# Patient Record
Sex: MALE | Race: WHITE | Employment: FULL TIME | URBAN - METROPOLITAN AREA
[De-identification: names, ages, dates, MRNs, and addresses within clinical notes are randomized per-mention and may not be internally consistent; named-entity substitution may affect disease eponyms.]

---

## 2017-06-21 ENCOUNTER — HOSPITAL ENCOUNTER (OUTPATIENT)
Dept: MRI IMAGING | Age: 57
Discharge: HOME | End: 2017-06-21
Payer: COMMERCIAL

## 2017-06-21 DIAGNOSIS — R25.9 PARKINSONIAN FEATURES: ICD-10-CM

## 2017-06-21 PROCEDURE — 70551 MRI BRAIN STEM W/O DYE: CPT

## 2017-09-08 ENCOUNTER — APPOINTMENT (OUTPATIENT)
Dept: GENERAL RADIOLOGY | Age: 57
DRG: 988 | End: 2017-09-08
Attending: FAMILY MEDICINE
Payer: COMMERCIAL

## 2017-09-08 ENCOUNTER — HOSPITAL ENCOUNTER (INPATIENT)
Age: 57
LOS: 2 days | Discharge: HOME HEALTH | DRG: 988 | End: 2017-09-12
Attending: FAMILY MEDICINE | Admitting: FAMILY MEDICINE
Payer: COMMERCIAL

## 2017-09-08 PROBLEM — R25.9 PARKINSONIAN FEATURES: Chronic | Status: ACTIVE | Noted: 2017-09-08

## 2017-09-08 PROBLEM — R29.818 PARKINSONIAN FEATURES: Chronic | Status: ACTIVE | Noted: 2017-09-08

## 2017-09-08 LAB
ABSOLUTE EOS #: 0.1 K/UL (ref 0–0.4)
ABSOLUTE LYMPH #: 0.7 K/UL (ref 1–4.8)
ABSOLUTE MONO #: 0.8 K/UL (ref 0.1–1.3)
ANION GAP SERPL CALCULATED.3IONS-SCNC: 12 MMOL/L (ref 9–17)
BASOPHILS # BLD: 0 %
BASOPHILS ABSOLUTE: 0 K/UL (ref 0–0.2)
BUN BLDV-MCNC: 15 MG/DL (ref 6–20)
BUN/CREAT BLD: ABNORMAL (ref 9–20)
C-REACTIVE PROTEIN: 214.1 MG/L (ref 0–5)
CALCIUM SERPL-MCNC: 8.8 MG/DL (ref 8.6–10.4)
CHLORIDE BLD-SCNC: 98 MMOL/L (ref 98–107)
CO2: 27 MMOL/L (ref 20–31)
CREAT SERPL-MCNC: 0.56 MG/DL (ref 0.7–1.2)
CULTURE: NORMAL
CULTURE: NORMAL
DIFFERENTIAL TYPE: ABNORMAL
DIRECT EXAM: NORMAL
EOSINOPHILS RELATIVE PERCENT: 1 %
GFR AFRICAN AMERICAN: >60 ML/MIN
GFR NON-AFRICAN AMERICAN: >60 ML/MIN
GFR SERPL CREATININE-BSD FRML MDRD: ABNORMAL ML/MIN/{1.73_M2}
GFR SERPL CREATININE-BSD FRML MDRD: ABNORMAL ML/MIN/{1.73_M2}
GLUCOSE BLD-MCNC: 174 MG/DL (ref 75–110)
GLUCOSE BLD-MCNC: 197 MG/DL (ref 75–110)
GLUCOSE BLD-MCNC: 235 MG/DL (ref 70–99)
HCT VFR BLD CALC: 28.4 % (ref 41–53)
HEMOGLOBIN: 10.1 G/DL (ref 13.5–17.5)
LYMPHOCYTES # BLD: 6 %
Lab: NORMAL
MCH RBC QN AUTO: 29.7 PG (ref 26–34)
MCHC RBC AUTO-ENTMCNC: 35.6 G/DL (ref 31–37)
MCV RBC AUTO: 83.4 FL (ref 80–100)
MONOCYTES # BLD: 7 %
PDW BLD-RTO: 12.5 % (ref 11.5–14.9)
PLATELET # BLD: 170 K/UL (ref 150–450)
PLATELET ESTIMATE: ABNORMAL
PMV BLD AUTO: 9.7 FL (ref 6–12)
POTASSIUM SERPL-SCNC: 4.6 MMOL/L (ref 3.7–5.3)
RBC # BLD: 3.41 M/UL (ref 4.5–5.9)
RBC # BLD: ABNORMAL 10*6/UL
SEDIMENTATION RATE, ERYTHROCYTE: 4 MM (ref 0–15)
SEG NEUTROPHILS: 86 %
SEGMENTED NEUTROPHILS ABSOLUTE COUNT: 9 K/UL (ref 1.3–9.1)
SODIUM BLD-SCNC: 137 MMOL/L (ref 135–144)
SPECIMEN DESCRIPTION: NORMAL
SPECIMEN DESCRIPTION: NORMAL
STATUS: NORMAL
WBC # BLD: 10.6 K/UL (ref 3.5–11)
WBC # BLD: ABNORMAL 10*3/UL

## 2017-09-08 PROCEDURE — 86403 PARTICLE AGGLUT ANTBDY SCRN: CPT

## 2017-09-08 PROCEDURE — G0378 HOSPITAL OBSERVATION PER HR: HCPCS

## 2017-09-08 PROCEDURE — 80048 BASIC METABOLIC PNL TOTAL CA: CPT

## 2017-09-08 PROCEDURE — 2580000003 HC RX 258: Performed by: FAMILY MEDICINE

## 2017-09-08 PROCEDURE — 36415 COLL VENOUS BLD VENIPUNCTURE: CPT

## 2017-09-08 PROCEDURE — 87205 SMEAR GRAM STAIN: CPT

## 2017-09-08 PROCEDURE — 85025 COMPLETE CBC W/AUTO DIFF WBC: CPT

## 2017-09-08 PROCEDURE — 96366 THER/PROPH/DIAG IV INF ADDON: CPT

## 2017-09-08 PROCEDURE — 96365 THER/PROPH/DIAG IV INF INIT: CPT

## 2017-09-08 PROCEDURE — 87040 BLOOD CULTURE FOR BACTERIA: CPT

## 2017-09-08 PROCEDURE — 87070 CULTURE OTHR SPECIMN AEROBIC: CPT

## 2017-09-08 PROCEDURE — 6370000000 HC RX 637 (ALT 250 FOR IP): Performed by: FAMILY MEDICINE

## 2017-09-08 PROCEDURE — 87077 CULTURE AEROBIC IDENTIFY: CPT

## 2017-09-08 PROCEDURE — G0379 DIRECT REFER HOSPITAL OBSERV: HCPCS

## 2017-09-08 PROCEDURE — 85651 RBC SED RATE NONAUTOMATED: CPT

## 2017-09-08 PROCEDURE — 73630 X-RAY EXAM OF FOOT: CPT

## 2017-09-08 PROCEDURE — 96367 TX/PROPH/DG ADDL SEQ IV INF: CPT

## 2017-09-08 PROCEDURE — 86140 C-REACTIVE PROTEIN: CPT

## 2017-09-08 PROCEDURE — 99253 IP/OBS CNSLTJ NEW/EST LOW 45: CPT | Performed by: PODIATRIST

## 2017-09-08 PROCEDURE — 87075 CULTR BACTERIA EXCEPT BLOOD: CPT

## 2017-09-08 PROCEDURE — 6360000002 HC RX W HCPCS: Performed by: FAMILY MEDICINE

## 2017-09-08 PROCEDURE — 82947 ASSAY GLUCOSE BLOOD QUANT: CPT

## 2017-09-08 PROCEDURE — 87186 SC STD MICRODIL/AGAR DIL: CPT

## 2017-09-08 RX ORDER — NICOTINE POLACRILEX 4 MG
15 LOZENGE BUCCAL PRN
Status: DISCONTINUED | OUTPATIENT
Start: 2017-09-08 | End: 2017-09-12 | Stop reason: HOSPADM

## 2017-09-08 RX ORDER — M-VIT,TX,IRON,MINS/CALC/FOLIC 27MG-0.4MG
1 TABLET ORAL DAILY
Status: DISCONTINUED | OUTPATIENT
Start: 2017-09-08 | End: 2017-09-12 | Stop reason: HOSPADM

## 2017-09-08 RX ORDER — DEXTROSE MONOHYDRATE 25 G/50ML
12.5 INJECTION, SOLUTION INTRAVENOUS PRN
Status: DISCONTINUED | OUTPATIENT
Start: 2017-09-08 | End: 2017-09-12 | Stop reason: HOSPADM

## 2017-09-08 RX ORDER — DEXTROSE MONOHYDRATE 50 MG/ML
100 INJECTION, SOLUTION INTRAVENOUS PRN
Status: DISCONTINUED | OUTPATIENT
Start: 2017-09-08 | End: 2017-09-12 | Stop reason: HOSPADM

## 2017-09-08 RX ADMIN — INSULIN LISPRO 1 UNITS: 100 INJECTION, SOLUTION INTRAVENOUS; SUBCUTANEOUS at 21:15

## 2017-09-08 RX ADMIN — DEXTROSE 1500 MG: 5 SOLUTION INTRAVENOUS at 21:16

## 2017-09-08 RX ADMIN — CARBIDOPA AND LEVODOPA 1 TABLET: 25; 100 TABLET ORAL at 20:36

## 2017-09-08 RX ADMIN — CEFTRIAXONE SODIUM 1 G: 1 INJECTION, POWDER, FOR SOLUTION INTRAMUSCULAR; INTRAVENOUS at 20:29

## 2017-09-08 ASSESSMENT — PAIN SCALES - GENERAL: PAINLEVEL_OUTOF10: 0

## 2017-09-09 ENCOUNTER — APPOINTMENT (OUTPATIENT)
Dept: MRI IMAGING | Age: 57
DRG: 988 | End: 2017-09-09
Attending: FAMILY MEDICINE
Payer: COMMERCIAL

## 2017-09-09 LAB
GLUCOSE BLD-MCNC: 119 MG/DL (ref 75–110)
GLUCOSE BLD-MCNC: 152 MG/DL (ref 75–110)
GLUCOSE BLD-MCNC: 154 MG/DL (ref 75–110)
GLUCOSE BLD-MCNC: 171 MG/DL (ref 75–110)
GLUCOSE BLD-MCNC: 247 MG/DL (ref 75–110)

## 2017-09-09 PROCEDURE — 6370000000 HC RX 637 (ALT 250 FOR IP): Performed by: FAMILY MEDICINE

## 2017-09-09 PROCEDURE — 2580000003 HC RX 258: Performed by: FAMILY MEDICINE

## 2017-09-09 PROCEDURE — 97530 THERAPEUTIC ACTIVITIES: CPT

## 2017-09-09 PROCEDURE — 97162 PT EVAL MOD COMPLEX 30 MIN: CPT

## 2017-09-09 PROCEDURE — G8979 MOBILITY GOAL STATUS: HCPCS

## 2017-09-09 PROCEDURE — 99222 1ST HOSP IP/OBS MODERATE 55: CPT | Performed by: FAMILY MEDICINE

## 2017-09-09 PROCEDURE — G8978 MOBILITY CURRENT STATUS: HCPCS

## 2017-09-09 PROCEDURE — G0378 HOSPITAL OBSERVATION PER HR: HCPCS

## 2017-09-09 PROCEDURE — 96366 THER/PROPH/DIAG IV INF ADDON: CPT

## 2017-09-09 PROCEDURE — 82947 ASSAY GLUCOSE BLOOD QUANT: CPT

## 2017-09-09 PROCEDURE — 6360000002 HC RX W HCPCS: Performed by: FAMILY MEDICINE

## 2017-09-09 RX ADMIN — VANCOMYCIN HYDROCHLORIDE 1500 MG: 10 INJECTION, POWDER, LYOPHILIZED, FOR SOLUTION INTRAVENOUS at 20:18

## 2017-09-09 RX ADMIN — CARBIDOPA AND LEVODOPA 1 TABLET: 25; 100 TABLET ORAL at 08:10

## 2017-09-09 RX ADMIN — METFORMIN HYDROCHLORIDE 500 MG: 500 TABLET, FILM COATED ORAL at 12:07

## 2017-09-09 RX ADMIN — METFORMIN HYDROCHLORIDE 500 MG: 500 TABLET, FILM COATED ORAL at 17:03

## 2017-09-09 RX ADMIN — VANCOMYCIN HYDROCHLORIDE 1500 MG: 10 INJECTION, POWDER, LYOPHILIZED, FOR SOLUTION INTRAVENOUS at 08:13

## 2017-09-09 RX ADMIN — CEFTRIAXONE SODIUM 1 G: 1 INJECTION, POWDER, FOR SOLUTION INTRAMUSCULAR; INTRAVENOUS at 19:07

## 2017-09-09 RX ADMIN — INSULIN LISPRO 1 UNITS: 100 INJECTION, SOLUTION INTRAVENOUS; SUBCUTANEOUS at 20:22

## 2017-09-09 RX ADMIN — MULTIPLE VITAMINS W/ MINERALS TAB 1 TABLET: TAB at 08:10

## 2017-09-09 RX ADMIN — INSULIN LISPRO 2 UNITS: 100 INJECTION, SOLUTION INTRAVENOUS; SUBCUTANEOUS at 17:03

## 2017-09-09 RX ADMIN — CARBIDOPA AND LEVODOPA 1 TABLET: 25; 100 TABLET ORAL at 20:21

## 2017-09-09 RX ADMIN — INSULIN LISPRO 4 UNITS: 100 INJECTION, SOLUTION INTRAVENOUS; SUBCUTANEOUS at 12:02

## 2017-09-09 RX ADMIN — METFORMIN HYDROCHLORIDE 500 MG: 500 TABLET, FILM COATED ORAL at 08:10

## 2017-09-09 RX ADMIN — CARBIDOPA AND LEVODOPA 1 TABLET: 25; 100 TABLET ORAL at 15:04

## 2017-09-09 ASSESSMENT — PAIN SCALES - GENERAL
PAINLEVEL_OUTOF10: 5
PAINLEVEL_OUTOF10: 0

## 2017-09-10 ENCOUNTER — APPOINTMENT (OUTPATIENT)
Dept: MRI IMAGING | Age: 57
DRG: 988 | End: 2017-09-10
Attending: FAMILY MEDICINE
Payer: COMMERCIAL

## 2017-09-10 LAB
ABSOLUTE BANDS #: 0.2 K/UL (ref 0–1)
ABSOLUTE EOS #: 0.2 K/UL (ref 0–0.4)
ABSOLUTE LYMPH #: 0.2 K/UL (ref 1–4.8)
ABSOLUTE MONO #: 0.61 K/UL (ref 0.1–1.3)
ANION GAP SERPL CALCULATED.3IONS-SCNC: 14 MMOL/L (ref 9–17)
BANDS: 2 %
BASOPHILS # BLD: 1 %
BASOPHILS ABSOLUTE: 0.1 K/UL (ref 0–0.2)
BUN BLDV-MCNC: 10 MG/DL (ref 6–20)
BUN BLDV-MCNC: 11 MG/DL (ref 6–20)
BUN/CREAT BLD: ABNORMAL (ref 9–20)
CALCIUM SERPL-MCNC: 8.8 MG/DL (ref 8.6–10.4)
CHLORIDE BLD-SCNC: 97 MMOL/L (ref 98–107)
CO2: 25 MMOL/L (ref 20–31)
CREAT SERPL-MCNC: 0.6 MG/DL (ref 0.7–1.2)
CREAT SERPL-MCNC: 0.63 MG/DL (ref 0.7–1.2)
DIFFERENTIAL TYPE: ABNORMAL
EOSINOPHILS RELATIVE PERCENT: 2 %
GFR AFRICAN AMERICAN: >60 ML/MIN
GFR AFRICAN AMERICAN: >60 ML/MIN
GFR NON-AFRICAN AMERICAN: >60 ML/MIN
GFR NON-AFRICAN AMERICAN: >60 ML/MIN
GFR SERPL CREATININE-BSD FRML MDRD: ABNORMAL ML/MIN/{1.73_M2}
GLUCOSE BLD-MCNC: 133 MG/DL (ref 75–110)
GLUCOSE BLD-MCNC: 135 MG/DL (ref 75–110)
GLUCOSE BLD-MCNC: 189 MG/DL (ref 75–110)
GLUCOSE BLD-MCNC: 230 MG/DL (ref 70–99)
GLUCOSE BLD-MCNC: 249 MG/DL (ref 75–110)
HCT VFR BLD CALC: 30.4 % (ref 41–53)
HEMOGLOBIN: 10.5 G/DL (ref 13.5–17.5)
LYMPHOCYTES # BLD: 2 %
MCH RBC QN AUTO: 29 PG (ref 26–34)
MCHC RBC AUTO-ENTMCNC: 34.7 G/DL (ref 31–37)
MCV RBC AUTO: 83.7 FL (ref 80–100)
MONOCYTES # BLD: 6 %
MORPHOLOGY: NORMAL
PDW BLD-RTO: 12.3 % (ref 11.5–14.9)
PLATELET # BLD: 192 K/UL (ref 150–450)
PLATELET ESTIMATE: ABNORMAL
PMV BLD AUTO: 8.8 FL (ref 6–12)
POTASSIUM SERPL-SCNC: 4.4 MMOL/L (ref 3.7–5.3)
RBC # BLD: 3.63 M/UL (ref 4.5–5.9)
RBC # BLD: ABNORMAL 10*6/UL
SEG NEUTROPHILS: 87 %
SEGMENTED NEUTROPHILS ABSOLUTE COUNT: 8.79 K/UL (ref 1.3–9.1)
SODIUM BLD-SCNC: 136 MMOL/L (ref 135–144)
VANCOMYCIN TROUGH DATE LAST DOSE: ABNORMAL
VANCOMYCIN TROUGH DOSE AMOUNT: 1500
VANCOMYCIN TROUGH TIME LAST DOSE: 2018
VANCOMYCIN TROUGH: 8.4 UG/ML (ref 10–20)
WBC # BLD: 10.1 K/UL (ref 3.5–11)
WBC # BLD: ABNORMAL 10*3/UL

## 2017-09-10 PROCEDURE — 6360000004 HC RX CONTRAST MEDICATION: Performed by: FAMILY MEDICINE

## 2017-09-10 PROCEDURE — 84520 ASSAY OF UREA NITROGEN: CPT

## 2017-09-10 PROCEDURE — 1200000000 HC SEMI PRIVATE

## 2017-09-10 PROCEDURE — 99232 SBSQ HOSP IP/OBS MODERATE 35: CPT | Performed by: FAMILY MEDICINE

## 2017-09-10 PROCEDURE — A9579 GAD-BASE MR CONTRAST NOS,1ML: HCPCS | Performed by: FAMILY MEDICINE

## 2017-09-10 PROCEDURE — 6360000002 HC RX W HCPCS: Performed by: INTERNAL MEDICINE

## 2017-09-10 PROCEDURE — 6360000002 HC RX W HCPCS: Performed by: FAMILY MEDICINE

## 2017-09-10 PROCEDURE — 2580000003 HC RX 258: Performed by: INTERNAL MEDICINE

## 2017-09-10 PROCEDURE — 82947 ASSAY GLUCOSE BLOOD QUANT: CPT

## 2017-09-10 PROCEDURE — 36415 COLL VENOUS BLD VENIPUNCTURE: CPT

## 2017-09-10 PROCEDURE — 82565 ASSAY OF CREATININE: CPT

## 2017-09-10 PROCEDURE — 6370000000 HC RX 637 (ALT 250 FOR IP): Performed by: FAMILY MEDICINE

## 2017-09-10 PROCEDURE — 80202 ASSAY OF VANCOMYCIN: CPT

## 2017-09-10 PROCEDURE — 2580000003 HC RX 258: Performed by: FAMILY MEDICINE

## 2017-09-10 PROCEDURE — 99254 IP/OBS CNSLTJ NEW/EST MOD 60: CPT | Performed by: INTERNAL MEDICINE

## 2017-09-10 PROCEDURE — 85025 COMPLETE CBC W/AUTO DIFF WBC: CPT

## 2017-09-10 PROCEDURE — 73723 MRI JOINT LWR EXTR W/O&W/DYE: CPT

## 2017-09-10 PROCEDURE — 80048 BASIC METABOLIC PNL TOTAL CA: CPT

## 2017-09-10 RX ORDER — SODIUM CHLORIDE 0.9 % (FLUSH) 0.9 %
10 SYRINGE (ML) INJECTION PRN
Status: DISCONTINUED | OUTPATIENT
Start: 2017-09-10 | End: 2017-09-12 | Stop reason: HOSPADM

## 2017-09-10 RX ADMIN — CARBIDOPA AND LEVODOPA 1 TABLET: 25; 100 TABLET ORAL at 22:34

## 2017-09-10 RX ADMIN — MULTIPLE VITAMINS W/ MINERALS TAB 1 TABLET: TAB at 07:42

## 2017-09-10 RX ADMIN — VANCOMYCIN HYDROCHLORIDE 1750 MG: 10 INJECTION, POWDER, LYOPHILIZED, FOR SOLUTION INTRAVENOUS at 12:37

## 2017-09-10 RX ADMIN — METFORMIN HYDROCHLORIDE 500 MG: 500 TABLET, FILM COATED ORAL at 16:33

## 2017-09-10 RX ADMIN — METFORMIN HYDROCHLORIDE 500 MG: 500 TABLET, FILM COATED ORAL at 12:41

## 2017-09-10 RX ADMIN — CARBIDOPA AND LEVODOPA 1 TABLET: 25; 100 TABLET ORAL at 14:23

## 2017-09-10 RX ADMIN — CARBIDOPA AND LEVODOPA 1 TABLET: 25; 100 TABLET ORAL at 07:42

## 2017-09-10 RX ADMIN — INSULIN LISPRO 4 UNITS: 100 INJECTION, SOLUTION INTRAVENOUS; SUBCUTANEOUS at 16:33

## 2017-09-10 RX ADMIN — Medication 10 ML: at 22:34

## 2017-09-10 RX ADMIN — PIPERACILLIN SODIUM,TAZOBACTAM SODIUM 3.38 G: 3; .375 INJECTION, POWDER, FOR SOLUTION INTRAVENOUS at 22:34

## 2017-09-10 RX ADMIN — INSULIN LISPRO 2 UNITS: 100 INJECTION, SOLUTION INTRAVENOUS; SUBCUTANEOUS at 12:38

## 2017-09-10 RX ADMIN — Medication 10 ML: at 12:20

## 2017-09-10 RX ADMIN — GADOPENTETATE DIMEGLUMINE 20 ML: 469.01 INJECTION INTRAVENOUS at 12:20

## 2017-09-10 RX ADMIN — PIPERACILLIN SODIUM,TAZOBACTAM SODIUM 3.38 G: 3; .375 INJECTION, POWDER, FOR SOLUTION INTRAVENOUS at 16:34

## 2017-09-10 RX ADMIN — METFORMIN HYDROCHLORIDE 500 MG: 500 TABLET, FILM COATED ORAL at 07:42

## 2017-09-10 ASSESSMENT — PAIN SCALES - GENERAL: PAINLEVEL_OUTOF10: 0

## 2017-09-11 ENCOUNTER — ANESTHESIA EVENT (OUTPATIENT)
Dept: OPERATING ROOM | Age: 57
DRG: 988 | End: 2017-09-11
Payer: COMMERCIAL

## 2017-09-11 ENCOUNTER — ANESTHESIA (OUTPATIENT)
Dept: OPERATING ROOM | Age: 57
DRG: 988 | End: 2017-09-11
Payer: COMMERCIAL

## 2017-09-11 VITALS — SYSTOLIC BLOOD PRESSURE: 105 MMHG | DIASTOLIC BLOOD PRESSURE: 63 MMHG | OXYGEN SATURATION: 100 %

## 2017-09-11 LAB
ABSOLUTE EOS #: 0.3 K/UL (ref 0–0.4)
ABSOLUTE LYMPH #: 0.8 K/UL (ref 1–4.8)
ABSOLUTE MONO #: 1.2 K/UL (ref 0.1–1.3)
ANION GAP SERPL CALCULATED.3IONS-SCNC: 12 MMOL/L (ref 9–17)
BASOPHILS # BLD: 0 %
BASOPHILS ABSOLUTE: 0 K/UL (ref 0–0.2)
BUN BLDV-MCNC: 10 MG/DL (ref 6–20)
BUN/CREAT BLD: ABNORMAL (ref 9–20)
CALCIUM SERPL-MCNC: 8.7 MG/DL (ref 8.6–10.4)
CHLORIDE BLD-SCNC: 95 MMOL/L (ref 98–107)
CO2: 29 MMOL/L (ref 20–31)
CREAT SERPL-MCNC: 0.75 MG/DL (ref 0.7–1.2)
DIFFERENTIAL TYPE: ABNORMAL
EOSINOPHILS RELATIVE PERCENT: 2 %
GFR AFRICAN AMERICAN: >60 ML/MIN
GFR NON-AFRICAN AMERICAN: >60 ML/MIN
GFR SERPL CREATININE-BSD FRML MDRD: ABNORMAL ML/MIN/{1.73_M2}
GFR SERPL CREATININE-BSD FRML MDRD: ABNORMAL ML/MIN/{1.73_M2}
GLUCOSE BLD-MCNC: 139 MG/DL (ref 75–110)
GLUCOSE BLD-MCNC: 150 MG/DL (ref 75–110)
GLUCOSE BLD-MCNC: 160 MG/DL (ref 75–110)
GLUCOSE BLD-MCNC: 177 MG/DL (ref 70–99)
GLUCOSE BLD-MCNC: 210 MG/DL (ref 75–110)
HCT VFR BLD CALC: 30.9 % (ref 41–53)
HEMOGLOBIN: 10.6 G/DL (ref 13.5–17.5)
LYMPHOCYTES # BLD: 7 %
MCH RBC QN AUTO: 28.5 PG (ref 26–34)
MCHC RBC AUTO-ENTMCNC: 34.4 G/DL (ref 31–37)
MCV RBC AUTO: 83.1 FL (ref 80–100)
MONOCYTES # BLD: 11 %
PDW BLD-RTO: 12.5 % (ref 11.5–14.9)
PLATELET # BLD: 218 K/UL (ref 150–450)
PLATELET ESTIMATE: ABNORMAL
PMV BLD AUTO: 8.5 FL (ref 6–12)
POTASSIUM SERPL-SCNC: 4.2 MMOL/L (ref 3.7–5.3)
RBC # BLD: 3.72 M/UL (ref 4.5–5.9)
RBC # BLD: ABNORMAL 10*6/UL
SEG NEUTROPHILS: 80 %
SEGMENTED NEUTROPHILS ABSOLUTE COUNT: 9 K/UL (ref 1.3–9.1)
SODIUM BLD-SCNC: 136 MMOL/L (ref 135–144)
WBC # BLD: 11.3 K/UL (ref 3.5–11)
WBC # BLD: ABNORMAL 10*3/UL

## 2017-09-11 PROCEDURE — 87186 SC STD MICRODIL/AGAR DIL: CPT

## 2017-09-11 PROCEDURE — 3600000012 HC SURGERY LEVEL 2 ADDTL 15MIN: Performed by: PODIATRIST

## 2017-09-11 PROCEDURE — 87075 CULTR BACTERIA EXCEPT BLOOD: CPT

## 2017-09-11 PROCEDURE — 80048 BASIC METABOLIC PNL TOTAL CA: CPT

## 2017-09-11 PROCEDURE — 6370000000 HC RX 637 (ALT 250 FOR IP): Performed by: PODIATRIST

## 2017-09-11 PROCEDURE — 86403 PARTICLE AGGLUT ANTBDY SCRN: CPT

## 2017-09-11 PROCEDURE — 6360000002 HC RX W HCPCS: Performed by: INTERNAL MEDICINE

## 2017-09-11 PROCEDURE — 2580000003 HC RX 258: Performed by: NURSE ANESTHETIST, CERTIFIED REGISTERED

## 2017-09-11 PROCEDURE — 3700000001 HC ADD 15 MINUTES (ANESTHESIA): Performed by: PODIATRIST

## 2017-09-11 PROCEDURE — 10061 I&D ABSCESS COMP/MULTIPLE: CPT | Performed by: PODIATRIST

## 2017-09-11 PROCEDURE — 85025 COMPLETE CBC W/AUTO DIFF WBC: CPT

## 2017-09-11 PROCEDURE — 7100000001 HC PACU RECOVERY - ADDTL 15 MIN: Performed by: PODIATRIST

## 2017-09-11 PROCEDURE — 1200000000 HC SEMI PRIVATE

## 2017-09-11 PROCEDURE — 36415 COLL VENOUS BLD VENIPUNCTURE: CPT

## 2017-09-11 PROCEDURE — 2500000003 HC RX 250 WO HCPCS: Performed by: PODIATRIST

## 2017-09-11 PROCEDURE — 97116 GAIT TRAINING THERAPY: CPT

## 2017-09-11 PROCEDURE — 6370000000 HC RX 637 (ALT 250 FOR IP): Performed by: FAMILY MEDICINE

## 2017-09-11 PROCEDURE — 2580000003 HC RX 258: Performed by: INTERNAL MEDICINE

## 2017-09-11 PROCEDURE — 0J9R0ZZ DRAINAGE OF LEFT FOOT SUBCUTANEOUS TISSUE AND FASCIA, OPEN APPROACH: ICD-10-PCS | Performed by: PODIATRIST

## 2017-09-11 PROCEDURE — 97110 THERAPEUTIC EXERCISES: CPT

## 2017-09-11 PROCEDURE — 99232 SBSQ HOSP IP/OBS MODERATE 35: CPT | Performed by: FAMILY MEDICINE

## 2017-09-11 PROCEDURE — 6360000002 HC RX W HCPCS: Performed by: FAMILY MEDICINE

## 2017-09-11 PROCEDURE — 87070 CULTURE OTHR SPECIMN AEROBIC: CPT

## 2017-09-11 PROCEDURE — 6360000002 HC RX W HCPCS: Performed by: NURSE ANESTHETIST, CERTIFIED REGISTERED

## 2017-09-11 PROCEDURE — 87205 SMEAR GRAM STAIN: CPT

## 2017-09-11 PROCEDURE — 7100000000 HC PACU RECOVERY - FIRST 15 MIN: Performed by: PODIATRIST

## 2017-09-11 PROCEDURE — 3700000000 HC ANESTHESIA ATTENDED CARE: Performed by: PODIATRIST

## 2017-09-11 PROCEDURE — 82947 ASSAY GLUCOSE BLOOD QUANT: CPT

## 2017-09-11 PROCEDURE — 2580000003 HC RX 258: Performed by: FAMILY MEDICINE

## 2017-09-11 PROCEDURE — 3600000002 HC SURGERY LEVEL 2 BASE: Performed by: PODIATRIST

## 2017-09-11 RX ORDER — SODIUM CHLORIDE, SODIUM LACTATE, POTASSIUM CHLORIDE, CALCIUM CHLORIDE 600; 310; 30; 20 MG/100ML; MG/100ML; MG/100ML; MG/100ML
INJECTION, SOLUTION INTRAVENOUS CONTINUOUS PRN
Status: DISCONTINUED | OUTPATIENT
Start: 2017-09-11 | End: 2017-09-11 | Stop reason: SDUPTHER

## 2017-09-11 RX ORDER — DIPHENHYDRAMINE HYDROCHLORIDE 50 MG/ML
12.5 INJECTION INTRAMUSCULAR; INTRAVENOUS
Status: DISCONTINUED | OUTPATIENT
Start: 2017-09-11 | End: 2017-09-11 | Stop reason: HOSPADM

## 2017-09-11 RX ORDER — MEPERIDINE HYDROCHLORIDE 25 MG/ML
12.5 INJECTION INTRAMUSCULAR; INTRAVENOUS; SUBCUTANEOUS EVERY 5 MIN PRN
Status: DISCONTINUED | OUTPATIENT
Start: 2017-09-11 | End: 2017-09-11 | Stop reason: HOSPADM

## 2017-09-11 RX ORDER — SODIUM CHLORIDE 9 MG/ML
INJECTION, SOLUTION INTRAVENOUS CONTINUOUS
Status: DISCONTINUED | OUTPATIENT
Start: 2017-09-11 | End: 2017-09-12

## 2017-09-11 RX ORDER — LIDOCAINE HYDROCHLORIDE 10 MG/ML
INJECTION, SOLUTION INFILTRATION; PERINEURAL PRN
Status: DISCONTINUED | OUTPATIENT
Start: 2017-09-11 | End: 2017-09-11 | Stop reason: HOSPADM

## 2017-09-11 RX ORDER — THROMB-CAL-CELL-DRESSING,HEMOS 3" X 3"
1 PADS, MEDICATED (EA) TOPICAL PRN
Status: DISCONTINUED | OUTPATIENT
Start: 2017-09-11 | End: 2017-09-12 | Stop reason: HOSPADM

## 2017-09-11 RX ORDER — MORPHINE SULFATE 4 MG/ML
4 INJECTION, SOLUTION INTRAMUSCULAR; INTRAVENOUS
Status: DISCONTINUED | OUTPATIENT
Start: 2017-09-11 | End: 2017-09-12 | Stop reason: HOSPADM

## 2017-09-11 RX ORDER — OXYCODONE HYDROCHLORIDE AND ACETAMINOPHEN 5; 325 MG/1; MG/1
2 TABLET ORAL PRN
Status: DISCONTINUED | OUTPATIENT
Start: 2017-09-11 | End: 2017-09-11 | Stop reason: HOSPADM

## 2017-09-11 RX ORDER — MIDAZOLAM HYDROCHLORIDE 1 MG/ML
INJECTION INTRAMUSCULAR; INTRAVENOUS PRN
Status: DISCONTINUED | OUTPATIENT
Start: 2017-09-11 | End: 2017-09-11 | Stop reason: SDUPTHER

## 2017-09-11 RX ORDER — FENTANYL CITRATE 50 UG/ML
INJECTION, SOLUTION INTRAMUSCULAR; INTRAVENOUS PRN
Status: DISCONTINUED | OUTPATIENT
Start: 2017-09-11 | End: 2017-09-11 | Stop reason: SDUPTHER

## 2017-09-11 RX ORDER — LABETALOL HYDROCHLORIDE 5 MG/ML
5 INJECTION, SOLUTION INTRAVENOUS EVERY 10 MIN PRN
Status: DISCONTINUED | OUTPATIENT
Start: 2017-09-11 | End: 2017-09-11 | Stop reason: HOSPADM

## 2017-09-11 RX ORDER — PROPOFOL 10 MG/ML
INJECTION, EMULSION INTRAVENOUS PRN
Status: DISCONTINUED | OUTPATIENT
Start: 2017-09-11 | End: 2017-09-11 | Stop reason: SDUPTHER

## 2017-09-11 RX ORDER — BUPIVACAINE HYDROCHLORIDE 5 MG/ML
INJECTION, SOLUTION EPIDURAL; INTRACAUDAL PRN
Status: DISCONTINUED | OUTPATIENT
Start: 2017-09-11 | End: 2017-09-11 | Stop reason: HOSPADM

## 2017-09-11 RX ORDER — OXYCODONE HYDROCHLORIDE AND ACETAMINOPHEN 5; 325 MG/1; MG/1
1 TABLET ORAL PRN
Status: DISCONTINUED | OUTPATIENT
Start: 2017-09-11 | End: 2017-09-11 | Stop reason: HOSPADM

## 2017-09-11 RX ORDER — PROMETHAZINE HYDROCHLORIDE 25 MG/ML
6.25 INJECTION, SOLUTION INTRAMUSCULAR; INTRAVENOUS
Status: DISCONTINUED | OUTPATIENT
Start: 2017-09-11 | End: 2017-09-11 | Stop reason: HOSPADM

## 2017-09-11 RX ORDER — PROPOFOL 10 MG/ML
INJECTION, EMULSION INTRAVENOUS CONTINUOUS PRN
Status: DISCONTINUED | OUTPATIENT
Start: 2017-09-11 | End: 2017-09-11 | Stop reason: SDUPTHER

## 2017-09-11 RX ORDER — MORPHINE SULFATE 2 MG/ML
2 INJECTION, SOLUTION INTRAMUSCULAR; INTRAVENOUS
Status: DISCONTINUED | OUTPATIENT
Start: 2017-09-11 | End: 2017-09-12 | Stop reason: HOSPADM

## 2017-09-11 RX ADMIN — FENTANYL CITRATE 50 MCG: 50 INJECTION, SOLUTION INTRAMUSCULAR; INTRAVENOUS at 12:47

## 2017-09-11 RX ADMIN — CARBIDOPA AND LEVODOPA 1 TABLET: 25; 100 TABLET ORAL at 21:47

## 2017-09-11 RX ADMIN — PROPOFOL 25 MCG/KG/MIN: 10 INJECTION, EMULSION INTRAVENOUS at 12:36

## 2017-09-11 RX ADMIN — VANCOMYCIN HYDROCHLORIDE 1750 MG: 10 INJECTION, POWDER, LYOPHILIZED, FOR SOLUTION INTRAVENOUS at 18:00

## 2017-09-11 RX ADMIN — MULTIPLE VITAMINS W/ MINERALS TAB 1 TABLET: TAB at 16:08

## 2017-09-11 RX ADMIN — PROPOFOL 40 MG: 10 INJECTION, EMULSION INTRAVENOUS at 12:33

## 2017-09-11 RX ADMIN — CARBIDOPA AND LEVODOPA 1 TABLET: 25; 100 TABLET ORAL at 16:07

## 2017-09-11 RX ADMIN — GELATIN ABSORBABLE SPONGE 12-7 MM 1 EACH: 12-7 MISC at 16:04

## 2017-09-11 RX ADMIN — VANCOMYCIN HYDROCHLORIDE 1750 MG: 10 INJECTION, POWDER, LYOPHILIZED, FOR SOLUTION INTRAVENOUS at 00:04

## 2017-09-11 RX ADMIN — MIDAZOLAM 1 MG: 1 INJECTION INTRAMUSCULAR; INTRAVENOUS at 12:47

## 2017-09-11 RX ADMIN — INSULIN LISPRO 2 UNITS: 100 INJECTION, SOLUTION INTRAVENOUS; SUBCUTANEOUS at 16:06

## 2017-09-11 RX ADMIN — PIPERACILLIN SODIUM,TAZOBACTAM SODIUM 3.38 G: 3; .375 INJECTION, POWDER, FOR SOLUTION INTRAVENOUS at 15:28

## 2017-09-11 RX ADMIN — PIPERACILLIN SODIUM,TAZOBACTAM SODIUM 3.38 G: 3; .375 INJECTION, POWDER, FOR SOLUTION INTRAVENOUS at 21:46

## 2017-09-11 RX ADMIN — PIPERACILLIN SODIUM,TAZOBACTAM SODIUM 3.38 G: 3; .375 INJECTION, POWDER, FOR SOLUTION INTRAVENOUS at 03:59

## 2017-09-11 RX ADMIN — METFORMIN HYDROCHLORIDE 500 MG: 500 TABLET, FILM COATED ORAL at 16:01

## 2017-09-11 RX ADMIN — INSULIN LISPRO 2 UNITS: 100 INJECTION, SOLUTION INTRAVENOUS; SUBCUTANEOUS at 21:46

## 2017-09-11 RX ADMIN — THROMBIN, TOPICAL (BOVINE) 20000 UNITS: KIT at 16:04

## 2017-09-11 RX ADMIN — SODIUM CHLORIDE, POTASSIUM CHLORIDE, SODIUM LACTATE AND CALCIUM CHLORIDE: 600; 310; 30; 20 INJECTION, SOLUTION INTRAVENOUS at 12:24

## 2017-09-11 ASSESSMENT — PAIN DESCRIPTION - PAIN TYPE
TYPE: ACUTE PAIN
TYPE: ACUTE PAIN

## 2017-09-11 ASSESSMENT — PAIN DESCRIPTION - LOCATION
LOCATION: FOOT
LOCATION: FOOT

## 2017-09-11 ASSESSMENT — PAIN SCALES - GENERAL
PAINLEVEL_OUTOF10: 4
PAINLEVEL_OUTOF10: 5
PAINLEVEL_OUTOF10: 0

## 2017-09-11 ASSESSMENT — PAIN DESCRIPTION - ORIENTATION: ORIENTATION: LEFT

## 2017-09-12 VITALS
OXYGEN SATURATION: 98 % | WEIGHT: 202.05 LBS | TEMPERATURE: 98.2 F | SYSTOLIC BLOOD PRESSURE: 114 MMHG | HEART RATE: 94 BPM | RESPIRATION RATE: 16 BRPM | HEIGHT: 76 IN | BODY MASS INDEX: 24.6 KG/M2 | DIASTOLIC BLOOD PRESSURE: 61 MMHG

## 2017-09-12 LAB
ABSOLUTE EOS #: 0.3 K/UL (ref 0–0.4)
ABSOLUTE LYMPH #: 0.9 K/UL (ref 1–4.8)
ABSOLUTE MONO #: 0.8 K/UL (ref 0.1–1.3)
ALBUMIN SERPL-MCNC: 3.2 G/DL (ref 3.5–5.2)
ALBUMIN/GLOBULIN RATIO: ABNORMAL (ref 1–2.5)
ALP BLD-CCNC: 73 U/L (ref 40–129)
ALT SERPL-CCNC: 7 U/L (ref 5–41)
ANION GAP SERPL CALCULATED.3IONS-SCNC: 11 MMOL/L (ref 9–17)
AST SERPL-CCNC: 15 U/L
BASOPHILS # BLD: 1 %
BASOPHILS ABSOLUTE: 0 K/UL (ref 0–0.2)
BILIRUB SERPL-MCNC: 0.49 MG/DL (ref 0.3–1.2)
BILIRUBIN DIRECT: 0.16 MG/DL
BILIRUBIN, INDIRECT: 0.33 MG/DL (ref 0–1)
BUN BLDV-MCNC: 10 MG/DL (ref 6–20)
BUN/CREAT BLD: ABNORMAL (ref 9–20)
CALCIUM SERPL-MCNC: 8.9 MG/DL (ref 8.6–10.4)
CHLORIDE BLD-SCNC: 97 MMOL/L (ref 98–107)
CO2: 29 MMOL/L (ref 20–31)
CREAT SERPL-MCNC: 0.66 MG/DL (ref 0.7–1.2)
DIFFERENTIAL TYPE: ABNORMAL
EOSINOPHILS RELATIVE PERCENT: 4 %
GFR AFRICAN AMERICAN: >60 ML/MIN
GFR NON-AFRICAN AMERICAN: >60 ML/MIN
GFR SERPL CREATININE-BSD FRML MDRD: ABNORMAL ML/MIN/{1.73_M2}
GFR SERPL CREATININE-BSD FRML MDRD: ABNORMAL ML/MIN/{1.73_M2}
GLOBULIN: ABNORMAL G/DL (ref 1.5–3.8)
GLUCOSE BLD-MCNC: 105 MG/DL (ref 75–110)
GLUCOSE BLD-MCNC: 119 MG/DL (ref 70–99)
GLUCOSE BLD-MCNC: 128 MG/DL (ref 75–110)
GLUCOSE BLD-MCNC: 255 MG/DL (ref 75–110)
HCT VFR BLD CALC: 26.2 % (ref 41–53)
HEMOGLOBIN: 9.3 G/DL (ref 13.5–17.5)
LYMPHOCYTES # BLD: 12 %
MCH RBC QN AUTO: 29.5 PG (ref 26–34)
MCHC RBC AUTO-ENTMCNC: 35.4 G/DL (ref 31–37)
MCV RBC AUTO: 83.3 FL (ref 80–100)
MONOCYTES # BLD: 11 %
PDW BLD-RTO: 12.3 % (ref 11.5–14.9)
PLATELET # BLD: 219 K/UL (ref 150–450)
PLATELET ESTIMATE: ABNORMAL
PMV BLD AUTO: 8.5 FL (ref 6–12)
POTASSIUM SERPL-SCNC: 5.1 MMOL/L (ref 3.7–5.3)
RBC # BLD: 3.15 M/UL (ref 4.5–5.9)
RBC # BLD: ABNORMAL 10*6/UL
SEG NEUTROPHILS: 72 %
SEGMENTED NEUTROPHILS ABSOLUTE COUNT: 5.7 K/UL (ref 1.3–9.1)
SODIUM BLD-SCNC: 137 MMOL/L (ref 135–144)
TOTAL CK: 78 U/L (ref 39–308)
TOTAL PROTEIN: 6.7 G/DL (ref 6.4–8.3)
WBC # BLD: 7.8 K/UL (ref 3.5–11)
WBC # BLD: ABNORMAL 10*3/UL

## 2017-09-12 PROCEDURE — 6370000000 HC RX 637 (ALT 250 FOR IP): Performed by: FAMILY MEDICINE

## 2017-09-12 PROCEDURE — 99239 HOSP IP/OBS DSCHRG MGMT >30: CPT | Performed by: FAMILY MEDICINE

## 2017-09-12 PROCEDURE — 82947 ASSAY GLUCOSE BLOOD QUANT: CPT

## 2017-09-12 PROCEDURE — 82550 ASSAY OF CK (CPK): CPT

## 2017-09-12 PROCEDURE — 6360000002 HC RX W HCPCS: Performed by: INTERNAL MEDICINE

## 2017-09-12 PROCEDURE — 85025 COMPLETE CBC W/AUTO DIFF WBC: CPT

## 2017-09-12 PROCEDURE — 2580000003 HC RX 258: Performed by: FAMILY MEDICINE

## 2017-09-12 PROCEDURE — 36415 COLL VENOUS BLD VENIPUNCTURE: CPT

## 2017-09-12 PROCEDURE — 2580000003 HC RX 258: Performed by: INTERNAL MEDICINE

## 2017-09-12 PROCEDURE — 97165 OT EVAL LOW COMPLEX 30 MIN: CPT

## 2017-09-12 PROCEDURE — G8988 SELF CARE GOAL STATUS: HCPCS

## 2017-09-12 PROCEDURE — 80048 BASIC METABOLIC PNL TOTAL CA: CPT

## 2017-09-12 PROCEDURE — 80076 HEPATIC FUNCTION PANEL: CPT

## 2017-09-12 PROCEDURE — 99233 SBSQ HOSP IP/OBS HIGH 50: CPT | Performed by: INTERNAL MEDICINE

## 2017-09-12 PROCEDURE — 6360000002 HC RX W HCPCS: Performed by: FAMILY MEDICINE

## 2017-09-12 PROCEDURE — G8987 SELF CARE CURRENT STATUS: HCPCS

## 2017-09-12 RX ORDER — HYDROCODONE BITARTRATE AND ACETAMINOPHEN 5; 325 MG/1; MG/1
1 TABLET ORAL EVERY 6 HOURS PRN
Qty: 30 TABLET | Refills: 0 | Status: SHIPPED | OUTPATIENT
Start: 2017-09-12 | End: 2017-09-19

## 2017-09-12 RX ORDER — CIPROFLOXACIN 500 MG/1
500 TABLET, FILM COATED ORAL EVERY 12 HOURS SCHEDULED
Status: DISCONTINUED | OUTPATIENT
Start: 2017-09-12 | End: 2017-09-12 | Stop reason: HOSPADM

## 2017-09-12 RX ORDER — LINEZOLID 600 MG/1
600 TABLET, FILM COATED ORAL EVERY 12 HOURS SCHEDULED
Status: DISCONTINUED | OUTPATIENT
Start: 2017-09-12 | End: 2017-09-12

## 2017-09-12 RX ADMIN — CARBIDOPA AND LEVODOPA 1 TABLET: 25; 100 TABLET ORAL at 08:13

## 2017-09-12 RX ADMIN — INSULIN LISPRO 6 UNITS: 100 INJECTION, SOLUTION INTRAVENOUS; SUBCUTANEOUS at 11:51

## 2017-09-12 RX ADMIN — PIPERACILLIN SODIUM,TAZOBACTAM SODIUM 3.38 G: 3; .375 INJECTION, POWDER, FOR SOLUTION INTRAVENOUS at 03:55

## 2017-09-12 RX ADMIN — METFORMIN HYDROCHLORIDE 500 MG: 500 TABLET, FILM COATED ORAL at 08:14

## 2017-09-12 RX ADMIN — CARBIDOPA AND LEVODOPA 1 TABLET: 25; 100 TABLET ORAL at 14:28

## 2017-09-12 RX ADMIN — METFORMIN HYDROCHLORIDE 500 MG: 500 TABLET, FILM COATED ORAL at 16:53

## 2017-09-12 RX ADMIN — VANCOMYCIN HYDROCHLORIDE 1750 MG: 10 INJECTION, POWDER, LYOPHILIZED, FOR SOLUTION INTRAVENOUS at 06:32

## 2017-09-12 RX ADMIN — PIPERACILLIN SODIUM,TAZOBACTAM SODIUM 3.38 G: 3; .375 INJECTION, POWDER, FOR SOLUTION INTRAVENOUS at 10:10

## 2017-09-12 RX ADMIN — MULTIPLE VITAMINS W/ MINERALS TAB 1 TABLET: TAB at 08:13

## 2017-09-12 RX ADMIN — METFORMIN HYDROCHLORIDE 500 MG: 500 TABLET, FILM COATED ORAL at 11:51

## 2017-09-12 ASSESSMENT — PAIN DESCRIPTION - PAIN TYPE
TYPE: ACUTE PAIN;SURGICAL PAIN
TYPE: ACUTE PAIN

## 2017-09-12 ASSESSMENT — PAIN DESCRIPTION - LOCATION
LOCATION: FOOT

## 2017-09-12 ASSESSMENT — PAIN DESCRIPTION - ORIENTATION
ORIENTATION: LEFT

## 2017-09-12 ASSESSMENT — PAIN SCALES - GENERAL
PAINLEVEL_OUTOF10: 4
PAINLEVEL_OUTOF10: 3

## 2017-09-13 LAB
CULTURE: ABNORMAL
DIRECT EXAM: ABNORMAL
Lab: ABNORMAL
ORGANISM: ABNORMAL
SPECIMEN DESCRIPTION: ABNORMAL
STATUS: ABNORMAL

## 2017-09-14 LAB
CULTURE: NORMAL
Lab: NORMAL
SPECIMEN DESCRIPTION: NORMAL
STATUS: NORMAL
STATUS: NORMAL

## 2017-09-15 ENCOUNTER — HOSPITAL ENCOUNTER (OUTPATIENT)
Dept: WOUND CARE | Age: 57
Discharge: HOME | End: 2017-09-15
Payer: COMMERCIAL

## 2017-09-15 VITALS
RESPIRATION RATE: 18 BRPM | WEIGHT: 202 LBS | BODY MASS INDEX: 24.6 KG/M2 | HEART RATE: 94 BPM | DIASTOLIC BLOOD PRESSURE: 81 MMHG | SYSTOLIC BLOOD PRESSURE: 151 MMHG | HEIGHT: 76 IN | TEMPERATURE: 97 F

## 2017-09-15 DIAGNOSIS — L03.119 CELLULITIS AND ABSCESS OF FOOT: Primary | ICD-10-CM

## 2017-09-15 DIAGNOSIS — E11.628 TYPE 2 DIABETES MELLITUS WITH LEFT DIABETIC FOOT INFECTION (HCC): ICD-10-CM

## 2017-09-15 DIAGNOSIS — L97.509 TYPE 2 DIABETES MELLITUS WITH FOOT ULCER, WITHOUT LONG-TERM CURRENT USE OF INSULIN (HCC): ICD-10-CM

## 2017-09-15 DIAGNOSIS — L08.9 TYPE 2 DIABETES MELLITUS WITH LEFT DIABETIC FOOT INFECTION (HCC): ICD-10-CM

## 2017-09-15 DIAGNOSIS — L97.523 ULCER OF FOOT, LEFT, WITH NECROSIS OF MUSCLE (HCC): ICD-10-CM

## 2017-09-15 DIAGNOSIS — E11.621 TYPE 2 DIABETES MELLITUS WITH FOOT ULCER, WITHOUT LONG-TERM CURRENT USE OF INSULIN (HCC): ICD-10-CM

## 2017-09-15 DIAGNOSIS — E11.42 TYPE 2 DIABETES MELLITUS WITH DIABETIC POLYNEUROPATHY, WITHOUT LONG-TERM CURRENT USE OF INSULIN (HCC): ICD-10-CM

## 2017-09-15 DIAGNOSIS — L02.619 CELLULITIS AND ABSCESS OF FOOT: Primary | ICD-10-CM

## 2017-09-15 PROCEDURE — 11042 DBRDMT SUBQ TIS 1ST 20SQCM/<: CPT

## 2017-09-15 PROCEDURE — 11042 DBRDMT SUBQ TIS 1ST 20SQCM/<: CPT | Performed by: PODIATRIST

## 2017-09-15 PROCEDURE — 99214 OFFICE O/P EST MOD 30 MIN: CPT

## 2017-09-15 PROCEDURE — 6370000000 HC RX 637 (ALT 250 FOR IP): Performed by: PODIATRIST

## 2017-09-15 RX ADMIN — LIDOCAINE HYDROCHLORIDE: 20 JELLY TOPICAL at 08:47

## 2017-09-15 ASSESSMENT — PAIN SCALES - GENERAL: PAINLEVEL_OUTOF10: 0

## 2017-09-16 LAB
CULTURE: ABNORMAL
DIRECT EXAM: ABNORMAL
DIRECT EXAM: ABNORMAL
Lab: ABNORMAL
ORGANISM: ABNORMAL
SPECIMEN DESCRIPTION: ABNORMAL
SPECIMEN DESCRIPTION: ABNORMAL
STATUS: ABNORMAL

## 2017-09-21 ENCOUNTER — HOSPITAL ENCOUNTER (OUTPATIENT)
Dept: WOUND CARE | Age: 57
Discharge: HOME | DRG: 988 | End: 2017-09-21
Payer: COMMERCIAL

## 2017-09-21 VITALS
DIASTOLIC BLOOD PRESSURE: 92 MMHG | SYSTOLIC BLOOD PRESSURE: 139 MMHG | RESPIRATION RATE: 18 BRPM | TEMPERATURE: 97 F | HEART RATE: 94 BPM

## 2017-09-21 PROCEDURE — 11042 DBRDMT SUBQ TIS 1ST 20SQCM/<: CPT

## 2017-09-21 PROCEDURE — 6370000000 HC RX 637 (ALT 250 FOR IP): Performed by: NURSE PRACTITIONER

## 2017-09-21 PROCEDURE — 11042 DBRDMT SUBQ TIS 1ST 20SQCM/<: CPT | Performed by: NURSE PRACTITIONER

## 2017-09-21 RX ADMIN — LIDOCAINE HYDROCHLORIDE: 20 JELLY TOPICAL at 09:38

## 2017-09-29 ENCOUNTER — HOSPITAL ENCOUNTER (OUTPATIENT)
Dept: WOUND CARE | Age: 57
Discharge: HOME | End: 2017-09-29
Payer: COMMERCIAL

## 2017-09-29 VITALS
RESPIRATION RATE: 18 BRPM | SYSTOLIC BLOOD PRESSURE: 135 MMHG | HEART RATE: 88 BPM | WEIGHT: 202 LBS | BODY MASS INDEX: 24.6 KG/M2 | HEIGHT: 76 IN | DIASTOLIC BLOOD PRESSURE: 81 MMHG | TEMPERATURE: 97.7 F

## 2017-09-29 DIAGNOSIS — E11.42 TYPE 2 DIABETES MELLITUS WITH DIABETIC POLYNEUROPATHY, WITHOUT LONG-TERM CURRENT USE OF INSULIN (HCC): ICD-10-CM

## 2017-09-29 DIAGNOSIS — E11.621 TYPE 2 DIABETES MELLITUS WITH FOOT ULCER, WITHOUT LONG-TERM CURRENT USE OF INSULIN (HCC): ICD-10-CM

## 2017-09-29 DIAGNOSIS — L97.523 ULCER OF FOOT, LEFT, WITH NECROSIS OF MUSCLE (HCC): ICD-10-CM

## 2017-09-29 DIAGNOSIS — L08.9 TYPE 2 DIABETES MELLITUS WITH LEFT DIABETIC FOOT INFECTION (HCC): Primary | ICD-10-CM

## 2017-09-29 DIAGNOSIS — L97.509 TYPE 2 DIABETES MELLITUS WITH FOOT ULCER, WITHOUT LONG-TERM CURRENT USE OF INSULIN (HCC): ICD-10-CM

## 2017-09-29 DIAGNOSIS — E11.628 TYPE 2 DIABETES MELLITUS WITH LEFT DIABETIC FOOT INFECTION (HCC): Primary | ICD-10-CM

## 2017-09-29 PROCEDURE — 11042 DBRDMT SUBQ TIS 1ST 20SQCM/<: CPT

## 2017-09-29 PROCEDURE — 11043 DBRDMT MUSC&/FSCA 1ST 20/<: CPT | Performed by: PODIATRIST

## 2017-09-29 PROCEDURE — 6370000000 HC RX 637 (ALT 250 FOR IP): Performed by: PODIATRIST

## 2017-09-29 RX ADMIN — LIDOCAINE HYDROCHLORIDE: 20 JELLY TOPICAL at 10:26

## 2017-10-06 ENCOUNTER — HOSPITAL ENCOUNTER (OUTPATIENT)
Dept: WOUND CARE | Age: 57
Discharge: HOME | End: 2017-10-06
Payer: COMMERCIAL

## 2017-10-06 VITALS
DIASTOLIC BLOOD PRESSURE: 82 MMHG | BODY MASS INDEX: 24.6 KG/M2 | WEIGHT: 202 LBS | HEIGHT: 76 IN | TEMPERATURE: 96.7 F | RESPIRATION RATE: 18 BRPM | SYSTOLIC BLOOD PRESSURE: 146 MMHG | HEART RATE: 86 BPM

## 2017-10-06 DIAGNOSIS — E11.628 TYPE 2 DIABETES MELLITUS WITH LEFT DIABETIC FOOT INFECTION (HCC): ICD-10-CM

## 2017-10-06 DIAGNOSIS — L97.509 TYPE 2 DIABETES MELLITUS WITH FOOT ULCER, WITHOUT LONG-TERM CURRENT USE OF INSULIN (HCC): ICD-10-CM

## 2017-10-06 DIAGNOSIS — E11.42 TYPE 2 DIABETES MELLITUS WITH DIABETIC POLYNEUROPATHY, WITHOUT LONG-TERM CURRENT USE OF INSULIN (HCC): ICD-10-CM

## 2017-10-06 DIAGNOSIS — L97.523 ULCER OF FOOT, LEFT, WITH NECROSIS OF MUSCLE (HCC): Primary | ICD-10-CM

## 2017-10-06 DIAGNOSIS — L08.9 TYPE 2 DIABETES MELLITUS WITH LEFT DIABETIC FOOT INFECTION (HCC): ICD-10-CM

## 2017-10-06 DIAGNOSIS — E11.621 TYPE 2 DIABETES MELLITUS WITH FOOT ULCER, WITHOUT LONG-TERM CURRENT USE OF INSULIN (HCC): ICD-10-CM

## 2017-10-06 PROCEDURE — 86403 PARTICLE AGGLUT ANTBDY SCRN: CPT

## 2017-10-06 PROCEDURE — 6370000000 HC RX 637 (ALT 250 FOR IP): Performed by: PODIATRIST

## 2017-10-06 PROCEDURE — 87186 SC STD MICRODIL/AGAR DIL: CPT

## 2017-10-06 PROCEDURE — 11043 DBRDMT MUSC&/FSCA 1ST 20/<: CPT

## 2017-10-06 PROCEDURE — 87076 CULTURE ANAEROBE IDENT EACH: CPT

## 2017-10-06 PROCEDURE — 11043 DBRDMT MUSC&/FSCA 1ST 20/<: CPT | Performed by: PODIATRIST

## 2017-10-06 PROCEDURE — 87205 SMEAR GRAM STAIN: CPT

## 2017-10-06 PROCEDURE — 87070 CULTURE OTHR SPECIMN AEROBIC: CPT

## 2017-10-06 PROCEDURE — 87075 CULTR BACTERIA EXCEPT BLOOD: CPT

## 2017-10-06 PROCEDURE — 87185 SC STD ENZYME DETCJ PER NZM: CPT

## 2017-10-06 RX ORDER — CLINDAMYCIN HYDROCHLORIDE 300 MG/1
300 CAPSULE ORAL 3 TIMES DAILY
Qty: 90 CAPSULE | Refills: 0 | Status: SHIPPED | OUTPATIENT
Start: 2017-10-06 | End: 2017-11-10 | Stop reason: ALTCHOICE

## 2017-10-06 RX ORDER — CIPROFLOXACIN 500 MG/1
500 TABLET, FILM COATED ORAL 2 TIMES DAILY
Qty: 60 TABLET | Refills: 0 | Status: SHIPPED | OUTPATIENT
Start: 2017-10-06 | End: 2017-11-05

## 2017-10-06 RX ADMIN — LIDOCAINE HYDROCHLORIDE: 20 JELLY TOPICAL at 10:23

## 2017-10-06 ASSESSMENT — PAIN SCALES - GENERAL: PAINLEVEL_OUTOF10: 0

## 2017-10-06 NOTE — PLAN OF CARE
Problem: Falls - Risk of:  Goal: Will remain free from falls  Will remain free from falls   Outcome: Ongoing  Safety maintained no falls this visit

## 2017-10-06 NOTE — PROGRESS NOTES
[facility]  Progress Note      Shell Hall  AGE: 55 y.o. GENDER: male  : 1960  TODAY'S DATE:  10/6/2017    Chief Complaint   Patient presents with    Wound Check     wound         Subjective:      Shell Hall is a 55 y.o. male diabetic who presents to clinic for follow up Left foot wound. States he has been trying to stay off his foot but has been weight bearing. BG controlled. Patient relates minimal pain. BG under control. He is trying to eat more protein. Has been getting pus this week. Patient recently hospitalized from 17 through 17 for cellulitis and deep space abscess Left foot. MRI on 9/10/17 was negative for osteomyelitis. S/p I&D Left foot (DOS 17). Patient seen by ID Dr. Jonathan Paul while in house and discharged on ceftriaxone. Patient states he has completed IV antibiotics. Abx: Yes   Cultures: +MRSA on 17  Pain: 0/10    PAST MEDICAL HISTORY        Diagnosis Date    Diabetes mellitus ([facility] [address]     Shoulder bursitis        MEDICATIONS    Current Outpatient Prescriptions on File Prior to Encounter   Medication Sig Dispense Refill    carbidopa-levodopa (SINEMET)  MG per tablet TAKE 1 TABLET BY MOUTH THREE TIMES A DAY  1    metFORMIN (GLUCOPHAGE) 500 MG tablet 500 mg 3 times daily With meals      Probiotic Product (PROBIOTIC PO) Take by mouth      Lancets MISC Use daily 100 each 3    glucose blood VI test strips (ASCENSIA AUTODISC VI;ONE TOUCH ULTRA TEST VI) strip Use with associated glucose meter. Use q day 50 strip 3    Multiple Vitamins-Minerals (THERAPEUTIC MULTIVITAMIN-MINERALS) tablet Take 1 tablet by mouth daily       No current facility-administered medications on file prior to encounter.         ALLERGIES    No Known Allergies    PAST SURGICAL HISTORY    Past Surgical History:   Procedure Laterality Date    [place] DEEP DISSEC FOOT INFEC,1 BURSA Left 2017     INCISION AND DRAINAGE FOOT performed by Caroline Moe DPM at [address] [place] [address] OR       FAMILY HISTORY    family history includes Cancer in his sister; Diabetes in an other family member; Heart Disease in his mother; High Blood Pressure in his mother. SOCIAL HISTORY    Social History   Substance Use Topics    Smoking status: Former Smoker    Smokeless tobacco: Never Used    Alcohol use Yes      Comment: occasionally       REVIEW OF SYSTEMS    Constitutional: negative for anorexia, chills, fatigue, fevers, malaise, sweats and weight loss  Musculoskeletal:negative except for slight pain left foot  Neurological: positive for tremors and numbness  Behavioral/Psych: negative      Objective:      BP (!) 146/82  Pulse 86  Temp 96.7 °F (35.9 °C) (Tympanic)   Resp 18  Ht 6' 4\" (1.93 m)  Wt 202 lb (91.6 kg)  BMI 24.59 kg/m2  General Appearance: alert and oriented to person, place and time, well-developed and well-nourished, in no acute distress  Wound 09/09/17 Other (Comment) Foot Left;Plantar (Active)   Wound Type Wound 9/11/2017  9:45 AM   Dressing Status Clean;Dry; Intact 9/11/2017  9:45 AM   Dressing/Treatment Ace Wrap 9/11/2017  9:45 AM   Dressing Change Due 09/11/17 9/11/2017  9:45 AM   Wound Assessment LUAN 9/9/2017  8:18 PM   Bonita-wound Assessment Pink;Swelling 9/9/2017  8:18 PM   Number of days:26       Wound 09/15/17 #1 lt foot between great toe and second digit,surgery 9/11/17 (Active)   Wound Type Incision 10/6/2017 10:31 AM   Wound Other 9/15/2017  8:38 AM   Dressing Status Old drainage 10/6/2017 10:31 AM   Dressing Changed Changed/New 10/6/2017 10:31 AM   Wound Cleansed Rinsed/Irrigated with saline 10/6/2017 10:31 AM   Wound Length (cm) 4.5 cm 10/6/2017 10:31 AM   Wound Width (cm) 0.5 cm 10/6/2017 10:31 AM   Wound Depth (cm)  .5 10/6/2017 10:31 AM   Calculated Wound Size (cm^2) (l*w) 2.25 cm^2 10/6/2017 10:31 AM   Change in Wound Size % (l*w) -25 10/6/2017 10:31 AM   Wound Assessment Brown;Granulation tissue;Pink;Red 10/6/2017 10:31 AM   Margins Defined edges 9/15/2017  8:38 AM Bonita-wound Assessment Brown;Excoriated 10/6/2017 10:31 AM   [place]%Wound Bed 0 9/21/2017  9:17 AM   Red%Wound Bed 80 10/6/2017 10:31 AM   Yellow%Wound Bed 20 10/6/2017 10:31 AM   Drainage Amount Moderate 10/6/2017 10:31 AM   Drainage Description Serosanguinous; Yellow 10/6/2017 10:31 AM   Odor Mild 10/6/2017 10:31 AM   Debridement per physician Subcutaneous 9/21/2017  9:45 AM   Time out Yes 9/21/2017  9:45 AM   Procedural Pain 0 9/21/2017  9:45 AM   Post procedural Pain 0 9/21/2017  9:45 AM   Number of days:21       Wound 09/15/17 #2 left foot plantar (Active)   Wound Type Incision 9/29/2017 10:26 AM   Wound Other 9/15/2017  8:38 AM   Dressing Status Old drainage 10/6/2017 10:31 AM   Dressing Changed Changed/New 10/6/2017 10:31 AM   Wound Cleansed Rinsed/Irrigated with saline 10/6/2017 10:31 AM   Wound Length (cm) 2 cm 10/6/2017 10:31 AM   Wound Width (cm) 1.5 cm 10/6/2017 10:31 AM   Wound Depth (cm)  .1 10/6/2017 10:31 AM   Calculated Wound Size (cm^2) (l*w) 3 cm^2 10/6/2017 10:31 AM   Change in Wound Size % (l*w) 20 10/6/2017 10:31 AM   Distance Tunneling (cm) 2.5 cm 9/21/2017  9:17 AM   Tunneling Position ___ O'Clock 1 9/21/2017  9:17 AM   Wound Assessment Clean;Dry; Intact;Granulation tissue;Pink;Red 10/6/2017 10:31 AM   Margins Defined edges 9/15/2017  8:38 AM   Exposed structure Bone 9/29/2017 10:58 AM   Bonita-wound Assessment Maceration 10/6/2017 10:31 AM   [place]%Wound Bed 10 9/15/2017  8:38 AM   Red%Wound Bed 100 10/6/2017 10:31 AM   Yellow%Wound Bed 40 9/29/2017 10:26 AM   Drainage Amount Moderate 10/6/2017 10:31 AM   Drainage Description Serosanguinous 10/6/2017 10:31 AM   Odor None 10/6/2017 10:31 AM   Debridement per physician Subcutaneous 9/21/2017  9:45 AM   Time out Yes 9/21/2017  9:45 AM   Procedural Pain 0 9/21/2017  9:45 AM   Post procedural Pain 0 9/21/2017  9:45 AM   Number of days:21       Wound 10/06/17 wound#3 left foot plantar distal (Active)   Wound Type Wound 10/6/2017 10:31 AM   Dressing POSITIVE Final     Synercid  NOT REPORTED Final     cefoxitin screen  NOT REPORTED Final     ciprofloxacin  NOT REPORTED Final     clindamycin Resistant <=0.25 RESISTANT Final     erythromycin Resistant >=8 RESISTANT Final     gentamicin Sensitive <=0.5 SUSCEPTIBLE Final     levofloxacin Resistant >=8 RESISTANT Final     linezolid  NOT REPORTED Final     moxifloxacin  NOT REPORTED Final     nitrofurantoin  NOT REPORTED Final     oxacillin Resistant >=4 RESISTANT Final     penicillin Resistant >=0.5 RESISTANT Final     rifampin  NOT REPORTED Final     tetracycline Sensitive <=1 SUSCEPTIBLE Final     tigecycline  NOT REPORTED Final     trimethoprim-sulfamethoxazole Sensitive <=10 SUSCEPTIBLE Final     vancomycin Sensitive <=0.5 SUSCEPTIBLE Final                  Assessment:       1. Ulcer of foot, left, with necrosis of muscle    2. Type 2 diabetes mellitus with diabetic polyneuropathy, without long-term current use of insulin ([address]    3. Type 2 diabetes mellitus with foot ulcer, without long-term current use of insulin ([address]    4. Type 2 diabetes mellitus with left diabetic foot infection [facility])        Patient Active Problem List   Diagnosis    Hyperglycemia    Essential hypertension    Type 2 diabetes mellitus with left diabetic foot infection ([address]    Ulcer of foot ([address]    Type 2 diabetes mellitus with diabetic polyneuropathy, without long-term current use of insulin (HCC)    Cellulitis and abscess of foot    Parkinsonian features    Type 2 diabetes mellitus with foot ulcer, without long-term current use of insulin ([address]       Plan:   Pt was evaluated and examined. Patient was given personalized discharge instructions. Diagnosis was discussed with the pt and all of their questions were answered in detail. Proper foot hygiene and care was discussed with the pt. Patient to check feet daily and contact the office with any questions/problems/concerns.   Other comorbidity noted and will be managed by PCP.    Procedure:  Wound Location: left    Lidocaine gel soaked gauze was applied at beginning of wound evaluation. The wound(s) was excisionally debrided sharply of fibrotic, necrotic, and hyperkeratotic tissue, including a layer of surrounding healthy tissue using curette. The wound was debrided down through and including subcutaneous. , muscle    Percent of Wound Debrided: 75%    Total Surface Area Debrided:  (see above for sq cm)    Bleeding: Minimal    Patient tolerated procedure well and was given proper instruction. Post debridement wound description:  clean  Post debridement Wound Location:left foot    Plan for wound  - Treatment: Paint edges and incision line with betadine, pack lightly with silvercel rope, cover with an ABD, wrap with kerlix, and ACE wrap from toes to just above ankle. - Wear surgical shoe but strict non-weightbearing Left lower extremity  - Continue with antibioitcs  - Increase protein to diet (meat, cheese, eggs, fish, peanut butter, nuts and beans)  - Multivitamin daily  - Follow up: 1 week. - Detailed home instructions and education material given to patient prior to discharge. Patient is at high risk of developing new abscess versus osteomyelitis and therefore daily wound assessment by a health care provider is necessary. At this time patient has wound probing 4cm to bone from both plantar and webspace locations. The extent of wound care he requires on a daily basis which involves deep space wound packing exceeds what can be reasonably expected from the average family member. The female family member present with him today states that she cannot perform his wound care at this time nor can she vouch for anyone else in the family being adequate for the role. It is therefore my medical opinion that the insurance company provide daily dressing changes by [facility] until wound progresses to more superficial state to avoid proximal amputation.         Marylen Horton, DPM on

## 2017-10-13 ENCOUNTER — HOSPITAL ENCOUNTER (OUTPATIENT)
Dept: WOUND CARE | Age: 57
Discharge: HOME | End: 2017-10-13
Payer: COMMERCIAL

## 2017-10-13 VITALS
DIASTOLIC BLOOD PRESSURE: 79 MMHG | HEIGHT: 76 IN | TEMPERATURE: 97.3 F | SYSTOLIC BLOOD PRESSURE: 126 MMHG | RESPIRATION RATE: 18 BRPM | WEIGHT: 202 LBS | HEART RATE: 98 BPM | BODY MASS INDEX: 24.6 KG/M2

## 2017-10-13 DIAGNOSIS — E11.621 TYPE 2 DIABETES MELLITUS WITH FOOT ULCER, WITHOUT LONG-TERM CURRENT USE OF INSULIN (HCC): ICD-10-CM

## 2017-10-13 DIAGNOSIS — L97.523 ULCER OF FOOT, LEFT, WITH NECROSIS OF MUSCLE (HCC): Primary | ICD-10-CM

## 2017-10-13 DIAGNOSIS — E11.42 TYPE 2 DIABETES MELLITUS WITH DIABETIC POLYNEUROPATHY, WITHOUT LONG-TERM CURRENT USE OF INSULIN (HCC): ICD-10-CM

## 2017-10-13 DIAGNOSIS — L97.509 TYPE 2 DIABETES MELLITUS WITH FOOT ULCER, WITHOUT LONG-TERM CURRENT USE OF INSULIN (HCC): ICD-10-CM

## 2017-10-13 DIAGNOSIS — L08.9 TYPE 2 DIABETES MELLITUS WITH LEFT DIABETIC FOOT INFECTION (HCC): ICD-10-CM

## 2017-10-13 DIAGNOSIS — E11.628 TYPE 2 DIABETES MELLITUS WITH LEFT DIABETIC FOOT INFECTION (HCC): ICD-10-CM

## 2017-10-13 PROCEDURE — 11043 DBRDMT MUSC&/FSCA 1ST 20/<: CPT | Performed by: PODIATRIST

## 2017-10-13 PROCEDURE — 11043 DBRDMT MUSC&/FSCA 1ST 20/<: CPT

## 2017-10-13 PROCEDURE — 6370000000 HC RX 637 (ALT 250 FOR IP): Performed by: PODIATRIST

## 2017-10-13 RX ADMIN — LIDOCAINE HYDROCHLORIDE: 20 JELLY TOPICAL at 11:26

## 2017-10-13 ASSESSMENT — PAIN SCALES - GENERAL: PAINLEVEL_OUTOF10: 0

## 2017-10-13 NOTE — PLAN OF CARE
Problem: Pain:  Goal: Control of acute pain  Control of acute pain   Outcome: Completed Date Met: 10/13/17  Not acute

## 2017-10-13 NOTE — PLAN OF CARE
Problem: Wound:  Goal: Will show signs of wound healing; wound closure and no evidence of infection  Will show signs of wound healing; wound closure and no evidence of infection   Outcome: Ongoing  Wound continues to have deep areas will continue current dressing

## 2017-10-13 NOTE — PROGRESS NOTES
[facility]  Progress Note      Henry Mccracken  AGE: 55 y.o. GENDER: male  : 1960  TODAY'S DATE:  10/13/2017    No chief complaint on file. Subjective:      Henry Mccracken is a 55 y.o. male diabetic who presents to clinic for follow up Left foot wound. His culture was reviewed. He is on oral antibiotics to cover. States he has been trying to stay off his foot but has been weight bearing. BG controlled. Patient relates minimal pain. BG under control. He is trying to eat more protein. Patient recently hospitalized from 17 through 17 for cellulitis and deep space abscess Left foot. MRI on 9/10/17 was negative for osteomyelitis. S/p I&D Left foot (DOS 17). Patient seen by ID Dr. Lisa Walsh while in house and discharged on ceftriaxone. Patient states he has completed IV antibiotics. Abx: Yes   Cultures: +MRSA on 17  Pain: 0/10    PAST MEDICAL HISTORY        Diagnosis Date    Diabetes mellitus ([facility] [address]     Shoulder bursitis        MEDICATIONS    Current Outpatient Prescriptions on File Prior to Encounter   Medication Sig Dispense Refill    clindamycin (CLEOCIN) 300 MG capsule Take 1 capsule by mouth 3 times daily 90 capsule 0    ciprofloxacin (CIPRO) 500 MG tablet Take 1 tablet by mouth 2 times daily 60 tablet 0    carbidopa-levodopa (SINEMET)  MG per tablet TAKE 1 TABLET BY MOUTH THREE TIMES A DAY  1    metFORMIN (GLUCOPHAGE) 500 MG tablet 500 mg 3 times daily With meals      Probiotic Product (PROBIOTIC PO) Take by mouth      Lancets MISC Use daily 100 each 3    glucose blood VI test strips (ASCENSIA AUTODISC VI;ONE TOUCH ULTRA TEST VI) strip Use with associated glucose meter. Use q day 50 strip 3    Multiple Vitamins-Minerals (THERAPEUTIC MULTIVITAMIN-MINERALS) tablet Take 1 tablet by mouth daily       No current facility-administered medications on file prior to encounter.         ALLERGIES    No Known Allergies    PAST SURGICAL HISTORY    Past Surgical History:   Procedure Laterality Date    [place] DEEP DISSEC FOOT INFEC,1 BURSA Left 9/11/2017     INCISION AND DRAINAGE FOOT performed by Marylen Horton, DPM at [address] [facility]    family history includes Cancer in his sister; Diabetes in an other family member; Heart Disease in his mother; High Blood Pressure in his mother. SOCIAL HISTORY    Social History   Substance Use Topics    Smoking status: Former Smoker    Smokeless tobacco: Never Used    Alcohol use Yes      Comment: occasionally       REVIEW OF SYSTEMS    Constitutional: negative for anorexia, chills, fatigue, fevers, malaise, sweats and weight loss  Musculoskeletal:negative except for slight pain left foot  Neurological: positive for tremors and numbness  Behavioral/Psych: negative      Objective: There were no vitals taken for this visit. General Appearance: alert and oriented to person, place and time, well-developed and well-nourished, in no acute distress      Wound 09/15/17 #1 lt foot between great toe and second digit,surgery 9/11/17 (Active)   Wound Type Incision 10/13/2017 11:15 AM   Wound Other 9/15/2017  8:38 AM   Dressing Status Old drainage 10/13/2017 11:15 AM   Dressing Changed Changed/New 10/13/2017 11:15 AM   Wound Cleansed Rinsed/Irrigated with saline 10/13/2017 11:15 AM   Wound Length (cm) 2.7 cm 10/13/2017 11:15 AM   Wound Width (cm) 0.3 cm 10/13/2017 11:15 AM   Wound Depth (cm)  0.1 10/13/2017 11:15 AM   Calculated Wound Size (cm^2) (l*w) 0.81 cm^2 10/13/2017 11:15 AM   Change in Wound Size % (l*w) 55 10/13/2017 11:15 AM   Wound Assessment Clean;Dry; Intact; Hyper granulation tissue 10/13/2017 11:15 AM   Margins Defined edges 9/15/2017  8:38 AM   Bonita-wound Assessment Calloused;Clean 10/13/2017 11:15 AM   [place]%Wound Bed 0 9/21/2017  9:17 AM   Red%Wound Bed 100 10/13/2017 11:15 AM   Yellow%Wound Bed 0 10/13/2017 11:15 AM   Drainage Amount Moderate 10/13/2017 11:15 AM   Drainage Description Serosanguinous 10/13/2017 11:15 AM   Calculated Wound Size (cm^2) (l*w) 0.16 cm^2 10/13/2017 11:15 AM   Change in Wound Size % (l*w) 96.8 10/13/2017 11:15 AM   Wound Assessment Clean;Dry; Intact; Red 10/13/2017 11:15 AM   Bonita-wound Assessment Calloused 10/13/2017 11:15 AM   Red%Wound Bed 100 10/13/2017 11:15 AM   Yellow%Wound Bed 0 10/13/2017 11:15 AM   Drainage Amount Moderate 10/13/2017 11:15 AM   Drainage Description Serosanguinous 10/13/2017 11:15 AM   Odor None 10/13/2017 11:15 AM   Number of days: 7     Improved, no pus  Integument:  Warm, dry, supple, Bilateral.    Open lesions present, Left. Blanchable rubor that is somewhat dependent on elevation present to Left foot  Moderation sanguinous drainage noted. No purulence. Mild mal odor. Distal and proximal wounds probe to bone. Vascular:  DP/PT pulses palpable 2/4, Bilateral.    CFT <3 seconds to digits 1-5, Bilateral .   Hair growth absent to level of digits, Bilateral.  Edema present, Left. Erythema absent, Left.      Neurological:  Sensation absent to light touch to level of digits, Bilateral.  Protective sensation  absent via 5.07/10g Alden-Jonah monofilament 10/10 sites, Bilateral.  Vibratory sensation absent to 1st MPJ, Bilateral.     Musculoskeletal:  Muscle strength 5/5 all LE groups tested, Bilateral.     9/11/17 wound cultures  Culture & Susceptibility      METHICILLIN RESISTANT STAPHYLOCOCCUS AUREUS      Antibiotic Interpretation ANNA Status     Induced Clind Resist Resistant POSITIVE Final     Synercid  NOT REPORTED Final     cefoxitin screen  NOT REPORTED Final     ciprofloxacin  NOT REPORTED Final     clindamycin Resistant <=0.25 RESISTANT Final     erythromycin Resistant >=8 RESISTANT Final     gentamicin Sensitive <=0.5 SUSCEPTIBLE Final     levofloxacin Resistant >=8 RESISTANT Final     linezolid  NOT REPORTED Final     moxifloxacin  NOT REPORTED Final     nitrofurantoin  NOT REPORTED Final     oxacillin Resistant >=4 RESISTANT Final     penicillin Resistant >=0.5 RESISTANT Final     rifampin  NOT REPORTED Final     tetracycline Sensitive <=1 SUSCEPTIBLE Final     tigecycline  NOT REPORTED Final     trimethoprim-sulfamethoxazole Sensitive <=10 SUSCEPTIBLE Final     vancomycin Sensitive <=0.5 SUSCEPTIBLE Final                  Assessment:       1. Ulcer of foot, left, with necrosis of muscle    2. Type 2 diabetes mellitus with foot ulcer, without long-term current use of insulin ([address]    3. Type 2 diabetes mellitus with diabetic polyneuropathy, without long-term current use of insulin ([address]    4. Type 2 diabetes mellitus with left diabetic foot infection [facility])        Patient Active Problem List   Diagnosis    Hyperglycemia    Essential hypertension    Type 2 diabetes mellitus with left diabetic foot infection ([facility] [address] [facility]    Ulcer of foot ([facility] [address]    Type 2 diabetes mellitus with diabetic polyneuropathy, without long-term current use of insulin ([facility])    Cellulitis and abscess of foot    Parkinsonian features    Type 2 diabetes mellitus with foot ulcer, without long-term current use of insulin ([address]       Plan:   Pt was evaluated and examined. Patient was given personalized discharge instructions. Diagnosis was discussed with the pt and all of their questions were answered in detail. Proper foot hygiene and care was discussed with the pt. Patient to check feet daily and contact the office with any questions/problems/concerns. Other comorbidity noted and will be managed by PCP. Procedure:  Wound Location: left    Lidocaine gel soaked gauze was applied at beginning of wound evaluation. The wound(s) was excisionally debrided sharply of fibrotic, necrotic, and hyperkeratotic tissue, including a layer of surrounding healthy tissue using curette. The wound was debrided down through and including subcutaneous. , muscle    Percent of Wound Debrided: 75%    Total Surface Area Debrided:  (see above for sq cm)    Bleeding: Minimal    Patient tolerated procedure well and was given proper instruction. Post debridement wound description:  clean  Post debridement Wound Location:left foot    Plan for wound  - Treatment: Paint edges and incision line with betadine, pack lightly with silvercel rope, cover with an ABD, wrap with kerlix, and ACE wrap from toes to just above ankle. - Wear surgical shoe but strict non-weightbearing Left lower extremity  - Continue with antibioitcs  - Increase protein to diet (meat, cheese, eggs, fish, peanut butter, nuts and beans)  - Multivitamin daily  - Follow up: 1 week. - Detailed home instructions and education material given to patient prior to discharge. Patient is at high risk of developing new abscess versus osteomyelitis and therefore daily wound assessment by a health care provider is necessary. At this time patient has wound probing 4cm to bone from both plantar and webspace locations. The extent of wound care he requires on a daily basis which involves deep space wound packing exceeds what can be reasonably expected from the average family member. The female family member present with him today states that she cannot perform his wound care at this time nor can she vouch for anyone else in the family being adequate for the role. It is therefore my medical opinion that the insurance company provide daily dressing changes by [facility] until wound progresses to more superficial state to avoid proximal amputation.         Radha Ramos DPM on 10/13/2017 at 17:50 AM  Board Certified, American Board of Podiatric Surgery  Fellow, [employer] and [employer]

## 2017-10-20 ENCOUNTER — HOSPITAL ENCOUNTER (OUTPATIENT)
Dept: WOUND CARE | Age: 57
Discharge: HOME | End: 2017-10-20
Payer: COMMERCIAL

## 2017-10-20 VITALS
RESPIRATION RATE: 95 BRPM | SYSTOLIC BLOOD PRESSURE: 149 MMHG | HEART RATE: 95 BPM | DIASTOLIC BLOOD PRESSURE: 90 MMHG | TEMPERATURE: 95.2 F

## 2017-10-20 DIAGNOSIS — L97.509 TYPE 2 DIABETES MELLITUS WITH FOOT ULCER, WITHOUT LONG-TERM CURRENT USE OF INSULIN (HCC): ICD-10-CM

## 2017-10-20 DIAGNOSIS — L97.523 SKIN ULCER OF LEFT FOOT WITH NECROSIS OF MUSCLE (HCC): Primary | ICD-10-CM

## 2017-10-20 DIAGNOSIS — E11.42 TYPE 2 DIABETES MELLITUS WITH DIABETIC POLYNEUROPATHY, WITHOUT LONG-TERM CURRENT USE OF INSULIN (HCC): ICD-10-CM

## 2017-10-20 DIAGNOSIS — L08.9 TYPE 2 DIABETES MELLITUS WITH LEFT DIABETIC FOOT INFECTION (HCC): ICD-10-CM

## 2017-10-20 DIAGNOSIS — E11.621 TYPE 2 DIABETES MELLITUS WITH FOOT ULCER, WITHOUT LONG-TERM CURRENT USE OF INSULIN (HCC): ICD-10-CM

## 2017-10-20 DIAGNOSIS — E11.628 TYPE 2 DIABETES MELLITUS WITH LEFT DIABETIC FOOT INFECTION (HCC): ICD-10-CM

## 2017-10-20 PROCEDURE — 11042 DBRDMT SUBQ TIS 1ST 20SQCM/<: CPT

## 2017-10-20 PROCEDURE — 11042 DBRDMT SUBQ TIS 1ST 20SQCM/<: CPT | Performed by: PODIATRIST

## 2017-10-20 PROCEDURE — 6370000000 HC RX 637 (ALT 250 FOR IP): Performed by: PODIATRIST

## 2017-10-20 RX ORDER — CIPROFLOXACIN 500 MG/1
500 TABLET, FILM COATED ORAL 2 TIMES DAILY
Qty: 28 TABLET | Refills: 0 | Status: SHIPPED | OUTPATIENT
Start: 2017-10-20 | End: 2017-11-28 | Stop reason: ALTCHOICE

## 2017-10-20 RX ADMIN — LIDOCAINE HYDROCHLORIDE: 20 JELLY TOPICAL at 10:01

## 2017-10-20 NOTE — PROGRESS NOTES
ALLERGIES    No Known Allergies    PAST SURGICAL HISTORY    Past Surgical History:   Procedure Laterality Date    [place] DEEP DISSEC FOOT INFEC,1 BURSA Left 9/11/2017     INCISION AND DRAINAGE FOOT performed by Sherman Butts DPM at [address] [facility]    family history includes Cancer in his sister; Diabetes in an other family member; Heart Disease in his mother; High Blood Pressure in his mother. SOCIAL HISTORY    Social History   Substance Use Topics    Smoking status: Former Smoker    Smokeless tobacco: Never Used    Alcohol use Yes      Comment: occasionally       REVIEW OF SYSTEMS    Constitutional: negative for anorexia, chills, fatigue, fevers, malaise, sweats and weight loss  Musculoskeletal:negative except for slight pain left foot  Neurological: positive for tremors and numbness  Behavioral/Psych: negative      Objective:      BP (!) 149/90   Pulse 95   Temp 95.2 °F (35.1 °C) (Tympanic)   Resp (!) 95   General Appearance: alert and oriented to person, place and time, well-developed and well-nourished, in no acute distress      Wound 09/15/17 #1 lt foot between great toe and second digit,surgery 9/11/17 (Active)   Wound Type Incision 10/20/2017  9:41 AM   Wound Other 9/15/2017  8:38 AM   Dressing Status Old drainage 10/20/2017  9:41 AM   Dressing Changed Changed/New 10/20/2017  9:41 AM   Wound Cleansed Rinsed/Irrigated with saline 10/20/2017  9:41 AM   Wound Length (cm) 2.2 cm 10/20/2017  9:41 AM   Wound Width (cm) 1.3 cm 10/20/2017  9:41 AM   Wound Depth (cm)  0.1 10/20/2017  9:41 AM   Calculated Wound Size (cm^2) (l*w) 2.86 cm^2 10/20/2017  9:41 AM   Change in Wound Size % (l*w) -58.89 10/20/2017  9:41 AM   Wound Assessment Hyper granulation tissue 10/20/2017  9:41 AM   Margins Defined edges 9/15/2017  8:38 AM   Bonita-wound Assessment Calloused; Maceration 10/20/2017  9:41 AM   [place]%Wound Bed 0 9/21/2017  9:17 AM   Red%Wound Bed 70 10/20/2017  9:41 AM   Yellow%Wound Bed 30 10/20/2017  9:41 AM   Drainage Amount Moderate 10/20/2017  9:41 AM   Drainage Description Serosanguinous 10/20/2017  9:41 AM   Odor None 10/20/2017  9:41 AM   Debridement per physician Muscle 10/13/2017 11:37 AM   Time out Yes 10/13/2017 11:37 AM   Procedural Pain 0 10/13/2017 11:37 AM   Post procedural Pain 0 10/13/2017 11:37 AM   Number of days: 35       Wound 10/06/17 wound#3 left foot plantar distal (Active)   Wound Type Wound 10/13/2017 11:15 AM   Dressing Status Old drainage 10/13/2017 11:15 AM   Dressing Changed Changed/New 10/13/2017 11:15 AM   Wound Cleansed Rinsed/Irrigated with saline 10/13/2017 11:15 AM   Wound Length (cm) 3.8 cm 10/20/2017  9:41 AM   Wound Width (cm) 1.8 cm 10/20/2017  9:41 AM   Wound Depth (cm)  0.1 10/20/2017  9:41 AM   Calculated Wound Size (cm^2) (l*w) 6.84 cm^2 10/20/2017  9:41 AM   Change in Wound Size % (l*w) -36.8 10/20/2017  9:41 AM   Wound Assessment Clean;Dry; Intact; Red 10/13/2017 11:15 AM   Bonita-wound Assessment Calloused 10/13/2017 11:15 AM   Red%Wound Bed 100 10/13/2017 11:15 AM   Yellow%Wound Bed 0 10/13/2017 11:15 AM   Drainage Amount Moderate 10/13/2017 11:15 AM   Drainage Description Serosanguinous 10/13/2017 11:15 AM   Odor None 10/13/2017 11:15 AM   Debridement per physician Subcutaneous 10/20/2017  9:41 AM   Time out Yes 10/20/2017  9:41 AM   Procedural Pain 0 10/20/2017  9:41 AM   Post procedural Pain 0 10/20/2017  9:41 AM   Number of days: 14         Improved, no pus, not as deep    Integument:  Warm, dry, supple, Bilateral.    Open lesions present, Left. Blanchable rubor that is somewhat dependent on elevation present to Left foot  Moderation sanguinous drainage noted. No purulence. No mal odor. Distal and proximal wounds do not probe to bone. Vascular:  DP/PT pulses palpable 2/4, Bilateral.    CFT <3 seconds to digits 1-5, Bilateral .   Hair growth absent to level of digits, Bilateral.  Edema present, Left. Erythema absent, Left. packing exceeds what can be reasonably expected from the average family member. The female family member present with him today states that she cannot perform his wound care at this time nor can she vouch for anyone else in the family being adequate for the role. It is therefore my medical opinion that the insurance company provide daily dressing changes by Leo Morel until wound progresses to more superficial state to avoid proximal amputation.         Luiz Burroughs DPM on 10/20/2017 at 42:50 AM  Board Certified, American Board of Podiatric Surgery  Fellow, [employer] and [employer] [facility]

## 2017-10-27 ENCOUNTER — HOSPITAL ENCOUNTER (OUTPATIENT)
Dept: WOUND CARE | Age: 57
Discharge: HOME | End: 2017-10-27
Payer: COMMERCIAL

## 2017-10-27 VITALS
TEMPERATURE: 97.3 F | SYSTOLIC BLOOD PRESSURE: 134 MMHG | RESPIRATION RATE: 16 BRPM | DIASTOLIC BLOOD PRESSURE: 80 MMHG | HEART RATE: 89 BPM

## 2017-10-27 DIAGNOSIS — E11.621 TYPE 2 DIABETES MELLITUS WITH FOOT ULCER, WITHOUT LONG-TERM CURRENT USE OF INSULIN (HCC): ICD-10-CM

## 2017-10-27 DIAGNOSIS — L08.9 TYPE 2 DIABETES MELLITUS WITH LEFT DIABETIC FOOT INFECTION (HCC): ICD-10-CM

## 2017-10-27 DIAGNOSIS — L97.509 TYPE 2 DIABETES MELLITUS WITH FOOT ULCER, WITHOUT LONG-TERM CURRENT USE OF INSULIN (HCC): ICD-10-CM

## 2017-10-27 DIAGNOSIS — L97.523 SKIN ULCER OF LEFT FOOT WITH NECROSIS OF MUSCLE (HCC): Primary | ICD-10-CM

## 2017-10-27 DIAGNOSIS — E11.42 TYPE 2 DIABETES MELLITUS WITH DIABETIC POLYNEUROPATHY, WITHOUT LONG-TERM CURRENT USE OF INSULIN (HCC): ICD-10-CM

## 2017-10-27 DIAGNOSIS — E11.628 TYPE 2 DIABETES MELLITUS WITH LEFT DIABETIC FOOT INFECTION (HCC): ICD-10-CM

## 2017-10-27 PROCEDURE — 6370000000 HC RX 637 (ALT 250 FOR IP): Performed by: PODIATRIST

## 2017-10-27 PROCEDURE — 11042 DBRDMT SUBQ TIS 1ST 20SQCM/<: CPT | Performed by: PODIATRIST

## 2017-10-27 PROCEDURE — 11042 DBRDMT SUBQ TIS 1ST 20SQCM/<: CPT

## 2017-10-27 RX ADMIN — LIDOCAINE HYDROCHLORIDE: 20 JELLY TOPICAL at 10:58

## 2017-10-27 NOTE — PROGRESS NOTES
Yellow%Wound Bed 0 10/27/2017 10:54 AM   Drainage Amount Moderate 10/27/2017 10:54 AM   Drainage Description Serosanguinous 10/27/2017 10:54 AM   Odor None 10/27/2017 10:54 AM   Debridement per physician Subcutaneous 10/27/2017 11:10 AM   Time out Yes 10/27/2017 11:10 AM   Procedural Pain 0 10/27/2017 11:10 AM   Post procedural Pain 0 10/27/2017 11:10 AM   Number of days: 42       Wound 10/06/17 wound#3 left foot plantar distal (Active)   Wound Type Wound 10/27/2017 10:54 AM   Dressing Status Old drainage 10/27/2017 10:54 AM   Dressing Changed Changed/New 10/27/2017 10:54 AM   Wound Cleansed Rinsed/Irrigated with saline 10/27/2017 10:54 AM   Wound Length (cm) 2.9 cm 10/27/2017 11:10 AM   Wound Width (cm) 1.1 cm 10/27/2017 11:10 AM   Wound Depth (cm)  0.7 10/27/2017 11:10 AM   Calculated Wound Size (cm^2) (l*w) 3.19 cm^2 10/27/2017 11:10 AM   Change in Wound Size % (l*w) 36.2 10/27/2017 11:10 AM   Wound Assessment Clean;Dry; Intact; Red 10/13/2017 11:15 AM   Bonita-wound Assessment Calloused; Maceration 10/27/2017 10:54 AM   Red%Wound Bed 90 10/27/2017 10:54 AM   Yellow%Wound Bed 10 10/27/2017 10:54 AM   Drainage Amount Moderate 10/27/2017 10:54 AM   Drainage Description Serosanguinous 10/27/2017 10:54 AM   Odor None 10/27/2017 10:54 AM   Debridement per physician Subcutaneous 10/27/2017 11:10 AM   Time out Yes 10/27/2017 11:10 AM   Procedural Pain 0 10/27/2017 11:10 AM   Post procedural Pain 0 10/27/2017 11:10 AM   Number of days: 21       Improved, no pus, not as deep    Integument:  Warm, dry, supple, Bilateral.    Open lesions present, Left. Blanchable rubor that is somewhat dependent on elevation present to Left foot  Moderation sanguinous drainage noted. No purulence. No mal odor. Distal and proximal wounds do not probe to bone. Vascular:  DP/PT pulses palpable 2/4, Bilateral.    CFT <3 seconds to digits 1-5, Bilateral .   Hair growth absent to level of digits, Bilateral.  Edema present, Left.   Erythema Parkinsonian features    Type 2 diabetes mellitus with foot ulcer, without long-term current use of insulin (HCC)    Skin ulcer of left foot with necrosis of muscle ([address]       Plan:   Pt was evaluated and examined. Patient was given personalized discharge instructions. Diagnosis was discussed with the pt and all of their questions were answered in detail. Proper foot hygiene and care was discussed with the pt. Patient to check feet daily and contact the office with any questions/problems/concerns. Other comorbidity noted and will be managed by PCP. Procedure:  Wound Location: left    Lidocaine gel soaked gauze was applied at beginning of wound evaluation. The wound(s) was excisionally debrided sharply of fibrotic, necrotic, and hyperkeratotic tissue, including a layer of surrounding healthy tissue using curette. The wound was debrided down through and including subcutaneous. ,     Percent of Wound Debrided: 75%    Total Surface Area Debrided:  (see above for sq cm)    Bleeding: Minimal    Patient tolerated procedure well and was given proper instruction. Post debridement wound description:  clean  Post debridement Wound Location:left foot    Plan for wound  - Treatment: Paint edges and incision line with betadine, pack lightly with silvercel rope, cover with an ABD, wrap with kerlix, and ACE wrap from toes to just above ankle. - Wear surgical shoe but strict non-weightbearing Left lower extremity  - Continue with antibioitcs  - Increase protein to diet (meat, cheese, eggs, fish, peanut butter, nuts and beans)  - Multivitamin daily  - Follow up: 1 week. - Detailed home instructions and education material given to patient prior to discharge.            Emerson Howard DPM on 10/27/2017 at 91:31 AM  Board Certified, American Board of Podiatric Surgery  Fellow, [employer] and [employer] [place]

## 2017-10-27 NOTE — PLAN OF CARE
Problem: Wound:  Goal: Will show signs of wound healing; wound closure and no evidence of infection  Will show signs of wound healing; wound closure and no evidence of infection   Outcome: Ongoing  Wound improving will continue current dressing

## 2017-10-27 NOTE — PLAN OF CARE
Problem: Wound:  Goal: Will show signs of wound healing; wound closure and no evidence of infection  Will show signs of wound healing; wound closure and no evidence of infection   Outcome: Ongoing  Wound continues to improve will continue silvercel

## 2017-11-03 ENCOUNTER — HOSPITAL ENCOUNTER (OUTPATIENT)
Dept: WOUND CARE | Age: 57
Discharge: HOME | End: 2017-11-03
Payer: COMMERCIAL

## 2017-11-03 VITALS
WEIGHT: 202 LBS | DIASTOLIC BLOOD PRESSURE: 82 MMHG | BODY MASS INDEX: 24.6 KG/M2 | RESPIRATION RATE: 18 BRPM | HEART RATE: 89 BPM | TEMPERATURE: 97.3 F | HEIGHT: 76 IN | SYSTOLIC BLOOD PRESSURE: 138 MMHG

## 2017-11-03 DIAGNOSIS — E11.628 TYPE 2 DIABETES MELLITUS WITH LEFT DIABETIC FOOT INFECTION (HCC): ICD-10-CM

## 2017-11-03 DIAGNOSIS — E11.42 TYPE 2 DIABETES MELLITUS WITH DIABETIC POLYNEUROPATHY, WITHOUT LONG-TERM CURRENT USE OF INSULIN (HCC): ICD-10-CM

## 2017-11-03 DIAGNOSIS — L08.9 TYPE 2 DIABETES MELLITUS WITH LEFT DIABETIC FOOT INFECTION (HCC): ICD-10-CM

## 2017-11-03 DIAGNOSIS — L97.523 SKIN ULCER OF LEFT FOOT WITH NECROSIS OF MUSCLE (HCC): Primary | ICD-10-CM

## 2017-11-03 DIAGNOSIS — L97.509 TYPE 2 DIABETES MELLITUS WITH FOOT ULCER, WITHOUT LONG-TERM CURRENT USE OF INSULIN (HCC): ICD-10-CM

## 2017-11-03 DIAGNOSIS — E11.621 TYPE 2 DIABETES MELLITUS WITH FOOT ULCER, WITHOUT LONG-TERM CURRENT USE OF INSULIN (HCC): ICD-10-CM

## 2017-11-03 PROCEDURE — 11042 DBRDMT SUBQ TIS 1ST 20SQCM/<: CPT

## 2017-11-03 PROCEDURE — 6370000000 HC RX 637 (ALT 250 FOR IP): Performed by: PODIATRIST

## 2017-11-03 PROCEDURE — 11042 DBRDMT SUBQ TIS 1ST 20SQCM/<: CPT | Performed by: PODIATRIST

## 2017-11-03 RX ADMIN — LIDOCAINE HYDROCHLORIDE: 20 JELLY TOPICAL at 09:38

## 2017-11-03 ASSESSMENT — PAIN DESCRIPTION - PAIN TYPE: TYPE: ACUTE PAIN

## 2017-11-03 NOTE — PLAN OF CARE
Problem: Wound:  Goal: Will show signs of wound healing; wound closure and no evidence of infection  Will show signs of wound healing; wound closure and no evidence of infection   Outcome: Ongoing  Wound remains S/S free of infection, silver stephen rope continued    Problem: Falls - Risk of:  Goal: Will remain free from falls  Will remain free from falls   Outcome: Met This Shift  Safety maintained no falls this visit

## 2017-11-03 NOTE — PROGRESS NOTES
ALLERGIES    No Known Allergies    PAST SURGICAL HISTORY    Past Surgical History:   Procedure Laterality Date    [place] DEEP DISSEC FOOT INFEC,1 BURSA Left 9/11/2017     INCISION AND DRAINAGE FOOT performed by Ludy Cartwright DPM at [address] [facility]    family history includes Cancer in his sister; Diabetes in an other family member; Heart Disease in his mother; High Blood Pressure in his mother. SOCIAL HISTORY    Social History   Substance Use Topics    Smoking status: Former Smoker    Smokeless tobacco: Never Used    Alcohol use Yes      Comment: occasionally       REVIEW OF SYSTEMS    Constitutional: negative for anorexia, chills, fatigue, fevers, malaise, sweats and weight loss  Musculoskeletal:negative except for slight pain left foot  Neurological: positive for tremors and numbness  Behavioral/Psych: negative      Objective: There were no vitals taken for this visit.   General Appearance: alert and oriented to person, place and time, well-developed and well-nourished, in no acute distress    Wound 09/15/17 #1 lt foot between great toe and second digit,surgery 9/11/17 (Active)   Wound Type Incision 11/3/2017  9:33 AM   Wound Other 9/15/2017  8:38 AM   Dressing Status Old drainage 11/3/2017  9:33 AM   Dressing Changed Changed/New 11/3/2017  9:33 AM   Wound Cleansed Rinsed/Irrigated with saline 11/3/2017  9:33 AM   Wound Length (cm) 2 cm 11/3/2017  9:33 AM   Wound Width (cm) 0.8 cm 11/3/2017  9:33 AM   Wound Depth (cm)  .7 11/3/2017  9:33 AM   Calculated Wound Size (cm^2) (l*w) 1.6 cm^2 11/3/2017  9:33 AM   Change in Wound Size % (l*w) 11.11 11/3/2017  9:33 AM   Wound Assessment Granulation tissue 11/3/2017  9:33 AM   Margins Defined edges 9/15/2017  8:38 AM   Bonita-wound Assessment Fragile 11/3/2017  9:33 AM   [place]%Wound Bed 0 9/21/2017  9:17 AM   Red%Wound Bed 100 11/3/2017  9:33 AM   Yellow%Wound Bed 0 10/27/2017 10:54 AM   Drainage Amount Moderate 11/3/2017  9:33 AM   Drainage Description Serosanguinous; Yellow 11/3/2017  9:33 AM   Odor None 11/3/2017  9:33 AM   Debridement per physician Subcutaneous 10/27/2017 11:10 AM   Time out Yes 10/27/2017 11:10 AM   Procedural Pain 0 10/27/2017 11:10 AM   Post procedural Pain 0 10/27/2017 11:10 AM   Number of days: 49       Wound 10/06/17 wound#3 left foot plantar distal (Active)   Wound Type Wound 11/3/2017  9:33 AM   Dressing Status Old drainage 11/3/2017  9:33 AM   Dressing Changed Changed/New 11/3/2017  9:33 AM   Wound Cleansed Rinsed/Irrigated with saline 11/3/2017  9:33 AM   Wound Length (cm) 1 cm 11/3/2017  9:33 AM   Wound Width (cm) 0.5 cm 11/3/2017  9:33 AM   Wound Depth (cm)  .8 11/3/2017  9:33 AM   Calculated Wound Size (cm^2) (l*w) 0.5 cm^2 11/3/2017  9:33 AM   Change in Wound Size % (l*w) 90 11/3/2017  9:33 AM   Wound Assessment Granulation tissue 11/3/2017  9:33 AM   Bonita-wound Assessment Calloused; Maceration 11/3/2017  9:33 AM   Red%Wound Bed 90 11/3/2017  9:33 AM   Yellow%Wound Bed 10 11/3/2017  9:33 AM   Drainage Amount Moderate 11/3/2017  9:33 AM   Drainage Description Serosanguinous; Yellow 11/3/2017  9:33 AM   Odor None 11/3/2017  9:33 AM   Debridement per physician Subcutaneous 10/27/2017 11:10 AM   Time out Yes 10/27/2017 11:10 AM   Procedural Pain 0 10/27/2017 11:10 AM   Post procedural Pain 0 10/27/2017 11:10 AM   Number of days: 27       Improved, no pus, not as deep    Integument:  Warm, dry, supple, Bilateral.    Open lesions present, Left. Blanchable rubor that is somewhat dependent on elevation present to Left foot  Moderation sanguinous drainage noted. No purulence. No mal odor. Distal and proximal wounds do not probe to bone. Vascular:  DP/PT pulses palpable 2/4, Bilateral.    CFT <3 seconds to digits 1-5, Bilateral .   Hair growth absent to level of digits, Bilateral.  Edema present, Left. Erythema absent, Left.      Neurological:  Sensation absent to light touch to level of digits, Bilateral.  Protective sensation  absent via 5.07/10g [place]-Jonah monofilament 10/10 sites, Bilateral.  Vibratory sensation absent to 1st MPJ, Bilateral.     Musculoskeletal:  Muscle strength 5/5 all LE groups tested, Bilateral.     9/11/17 wound cultures  Culture & Susceptibility      METHICILLIN RESISTANT STAPHYLOCOCCUS AUREUS      Antibiotic Interpretation ANNA Status     Induced Clind Resist Resistant POSITIVE Final     Synercid  NOT REPORTED Final     cefoxitin screen  NOT REPORTED Final     ciprofloxacin  NOT REPORTED Final     clindamycin Resistant <=0.25 RESISTANT Final     erythromycin Resistant >=8 RESISTANT Final     gentamicin Sensitive <=0.5 SUSCEPTIBLE Final     levofloxacin Resistant >=8 RESISTANT Final     linezolid  NOT REPORTED Final     moxifloxacin  NOT REPORTED Final     nitrofurantoin  NOT REPORTED Final     oxacillin Resistant >=4 RESISTANT Final     penicillin Resistant >=0.5 RESISTANT Final     rifampin  NOT REPORTED Final     tetracycline Sensitive <=1 SUSCEPTIBLE Final     tigecycline  NOT REPORTED Final     trimethoprim-sulfamethoxazole Sensitive <=10 SUSCEPTIBLE Final     vancomycin Sensitive <=0.5 SUSCEPTIBLE Final                  Assessment:       1. Skin ulcer of left foot with necrosis of muscle ([facility]    2. Type 2 diabetes mellitus with foot ulcer, without long-term current use of insulin ([address]    3. Type 2 diabetes mellitus with diabetic polyneuropathy, without long-term current use of insulin ([address]    4.  Type 2 diabetes mellitus with left diabetic foot infection [facility])        Patient Active Problem List   Diagnosis    Hyperglycemia    Essential hypertension    Type 2 diabetes mellitus with left diabetic foot infection ([address]    Ulcer of foot ([facility]    Type 2 diabetes mellitus with diabetic polyneuropathy, without long-term current use of insulin (HCC)    Cellulitis and abscess of foot    Parkinsonian features    Type 2 diabetes mellitus with foot ulcer, without long-term current use of insulin ([facility]    Skin ulcer of left foot with necrosis of muscle ([facility]       Plan:   Pt was evaluated and examined. Patient was given personalized discharge instructions. Diagnosis was discussed with the pt and all of their questions were answered in detail. Proper foot hygiene and care was discussed with the pt. Patient to check feet daily and contact the office with any questions/problems/concerns. Other comorbidity noted and will be managed by PCP. Procedure:  Wound Location: left    Lidocaine gel soaked gauze was applied at beginning of wound evaluation. The wound(s) was excisionally debrided sharply of fibrotic, necrotic, and hyperkeratotic tissue, including a layer of surrounding healthy tissue using curette. The wound was debrided down through and including subcutaneous. ,     Percent of Wound Debrided: 75%    Total Surface Area Debrided:  (see above for sq cm)    Bleeding: Minimal    Patient tolerated procedure well and was given proper instruction. Post debridement wound description:  clean  Post debridement Wound Location:left foot    Plan for wound  - Treatment: Paint edges and incision line with betadine, pack lightly with silvercel rope, cover with an ABD, wrap with kerlix, and ACE wrap from toes to just above ankle. - Wear surgical shoe but strict non-weightbearing Left lower extremity  - Continue with antibioitcs  - Increase protein to diet (meat, cheese, eggs, fish, peanut butter, nuts and beans)  - Multivitamin daily  - Follow up: 1 week. - Detailed home instructions and education material given to patient prior to discharge.            Emerson Howard DPM on 11/3/2017 at 9:30 AM  Board Certified, American Board of Podiatric Surgery  Fellow, [employer] and [employer]

## 2017-11-10 ENCOUNTER — HOSPITAL ENCOUNTER (OUTPATIENT)
Dept: WOUND CARE | Age: 57
Discharge: HOME | End: 2017-11-10
Payer: COMMERCIAL

## 2017-11-10 VITALS
BODY MASS INDEX: 24.6 KG/M2 | DIASTOLIC BLOOD PRESSURE: 75 MMHG | WEIGHT: 202 LBS | SYSTOLIC BLOOD PRESSURE: 128 MMHG | TEMPERATURE: 98.4 F | HEART RATE: 100 BPM | RESPIRATION RATE: 18 BRPM | HEIGHT: 76 IN

## 2017-11-10 DIAGNOSIS — E11.621 TYPE 2 DIABETES MELLITUS WITH FOOT ULCER, WITHOUT LONG-TERM CURRENT USE OF INSULIN (HCC): ICD-10-CM

## 2017-11-10 DIAGNOSIS — L97.523 SKIN ULCER OF LEFT FOOT WITH NECROSIS OF MUSCLE (HCC): Primary | ICD-10-CM

## 2017-11-10 DIAGNOSIS — E11.628 TYPE 2 DIABETES MELLITUS WITH LEFT DIABETIC FOOT INFECTION (HCC): ICD-10-CM

## 2017-11-10 DIAGNOSIS — L08.9 TYPE 2 DIABETES MELLITUS WITH LEFT DIABETIC FOOT INFECTION (HCC): ICD-10-CM

## 2017-11-10 DIAGNOSIS — L97.509 TYPE 2 DIABETES MELLITUS WITH FOOT ULCER, WITHOUT LONG-TERM CURRENT USE OF INSULIN (HCC): ICD-10-CM

## 2017-11-10 DIAGNOSIS — E11.42 TYPE 2 DIABETES MELLITUS WITH DIABETIC POLYNEUROPATHY, WITHOUT LONG-TERM CURRENT USE OF INSULIN (HCC): ICD-10-CM

## 2017-11-10 PROCEDURE — 99212 OFFICE O/P EST SF 10 MIN: CPT

## 2017-11-10 PROCEDURE — 99213 OFFICE O/P EST LOW 20 MIN: CPT | Performed by: PODIATRIST

## 2017-11-10 ASSESSMENT — PAIN SCALES - GENERAL: PAINLEVEL_OUTOF10: 0

## 2017-11-28 ENCOUNTER — OFFICE VISIT (OUTPATIENT)
Dept: PODIATRY | Age: 57
End: 2017-11-28
Payer: COMMERCIAL

## 2017-11-28 VITALS
DIASTOLIC BLOOD PRESSURE: 89 MMHG | HEART RATE: 89 BPM | WEIGHT: 210 LBS | BODY MASS INDEX: 24.79 KG/M2 | HEIGHT: 77 IN | SYSTOLIC BLOOD PRESSURE: 149 MMHG

## 2017-11-28 DIAGNOSIS — E11.42 TYPE 2 DIABETES MELLITUS WITH DIABETIC POLYNEUROPATHY, WITHOUT LONG-TERM CURRENT USE OF INSULIN (HCC): Primary | ICD-10-CM

## 2017-11-28 DIAGNOSIS — L08.9 TYPE 2 DIABETES MELLITUS WITH LEFT DIABETIC FOOT INFECTION (HCC): ICD-10-CM

## 2017-11-28 DIAGNOSIS — L97.509 TYPE 2 DIABETES MELLITUS WITH FOOT ULCER, WITHOUT LONG-TERM CURRENT USE OF INSULIN (HCC): ICD-10-CM

## 2017-11-28 DIAGNOSIS — B35.1 ONYCHOMYCOSIS: ICD-10-CM

## 2017-11-28 DIAGNOSIS — E11.628 TYPE 2 DIABETES MELLITUS WITH LEFT DIABETIC FOOT INFECTION (HCC): ICD-10-CM

## 2017-11-28 DIAGNOSIS — E11.621 TYPE 2 DIABETES MELLITUS WITH FOOT ULCER, WITHOUT LONG-TERM CURRENT USE OF INSULIN (HCC): ICD-10-CM

## 2017-11-28 PROCEDURE — 3044F HG A1C LEVEL LT 7.0%: CPT | Performed by: PODIATRIST

## 2017-11-28 PROCEDURE — G8427 DOCREV CUR MEDS BY ELIG CLIN: HCPCS | Performed by: PODIATRIST

## 2017-11-28 PROCEDURE — 99213 OFFICE O/P EST LOW 20 MIN: CPT | Performed by: PODIATRIST

## 2017-11-28 PROCEDURE — 1036F TOBACCO NON-USER: CPT | Performed by: PODIATRIST

## 2017-11-28 PROCEDURE — 3017F COLORECTAL CA SCREEN DOC REV: CPT | Performed by: PODIATRIST

## 2017-11-28 PROCEDURE — G8484 FLU IMMUNIZE NO ADMIN: HCPCS | Performed by: PODIATRIST

## 2017-11-28 PROCEDURE — G8420 CALC BMI NORM PARAMETERS: HCPCS | Performed by: PODIATRIST

## 2017-11-28 ASSESSMENT — ENCOUNTER SYMPTOMS
NAUSEA: 0
COLOR CHANGE: 0
VOMITING: 0
CONSTIPATION: 0
DIARRHEA: 0

## 2017-11-28 NOTE — PROGRESS NOTES
[facility]  Return Patient    Chief Complaint   Patient presents with    Post-Op Check     Left foot recheck    Other     BLOODSUGAR: 100       Subjective: Dahiana Vasquez comes to clinic for Post-Op Check (Left foot recheck) and Other (BLOODSUGAR: 100)    he is a diabetic and states that he is back to work and doing well. I saw him in the [facility] after an I&D for a DFI. The wound is healed. Pt currently has complaint of thickened, elongated nails that they cannot manage by themselves. Pt's primary care physician is Caro Rios MD   Pt's last blood sugar was 100 . Lab Results   Component Value Date    LABA1C 5.8 05/01/2017      Complains of numbness in the feet bilat. Past Medical History:   Diagnosis Date    Diabetes mellitus ([address]     Shoulder bursitis        No Known Allergies  Current Outpatient Prescriptions on File Prior to Visit   Medication Sig Dispense Refill    carbidopa-levodopa (SINEMET)  MG per tablet TAKE 1 TABLET BY MOUTH THREE TIMES A DAY  1    metFORMIN (GLUCOPHAGE) 500 MG tablet 500 mg 3 times daily With meals      Probiotic Product (PROBIOTIC PO) Take by mouth      Lancets MISC Use daily 100 each 3    glucose blood VI test strips (ASCENSIA AUTODISC VI;ONE TOUCH ULTRA TEST VI) strip Use with associated glucose meter. Use q day 50 strip 3    Multiple Vitamins-Minerals (THERAPEUTIC MULTIVITAMIN-MINERALS) tablet Take 1 tablet by mouth daily       No current facility-administered medications on file prior to visit. Review of Systems   Constitutional: Negative for chills, diaphoresis, fatigue, fever and unexpected weight change. Cardiovascular: Negative for leg swelling. Gastrointestinal: Negative for constipation, diarrhea, nausea and vomiting. Musculoskeletal: Negative for arthralgias, gait problem and joint swelling. Skin: Positive for wound. Negative for color change, pallor and rash. Neurological: Positive for numbness. Negative for weakness. Objective:  General: AAO x 3 in NAD. Derm  Toenail Description  Sites of Onychomycosis Involvement (Check affected area)  [x] [x] [x] [x] [x] [x] [x] [x] [x] [x]  5 4 3 2 1 1 2 3 4 5                          Right                                        Left    Thickness  [x] [x] [x] [x] [x] [x] [x] [x] [x] [x]  5 4 3 2 1 1 2 3 4 5                         Right                                        Left    Dystrophic Changes   [x] [x] [x] [x] [x] [x] [x] [x] [x] [x]  5 4 3 2 1 1 2 3 4 5                         Right                                        Left    Color   [x] [x] [x] [x] [x] [x] [x] [x] [x] [x]  5 4 3 2 1 1 2 3 4 5                          Right                                        Left    Incurvation/Ingrowin   [] [] [] [] [] [] [] [] [] []  5 4 3 2 1 1 2 3 4 5                         Right                                        Left    Inflammation/Pain   [] [] [] [] [] [] [] [] [] []  5 4 3 2 1 1 2 3 4 5                         Right                                        Left       Skin lesion/ulceration Absent . Skin No rashes or nodules noted. .      Vascular:  DP/PT pulses palpable 2/4, Bilateral.    CFT <3 seconds to digits 1-5, Bilateral .   Hair growth present to level of digits, Bilateral.  Edema absent, Bilateral.  Erythema absent, Bilateral.     DM with PVD       []Yes    []No    Neurological:  Sensation absent to light touch to level of digits, Bilateral.  Protective sensation absent via 5.07/10g [place]-Jonah monofilament 10/10 sites, Bilateral.  Vibratory sensation absent to 1st MPJ, Bilateral.       Assessment:  55 y.o. male with:  1. Type 2 diabetes mellitus with diabetic polyneuropathy, without long-term current use of insulin ([address]    2. Type 2 diabetes mellitus with foot ulcer, without long-term current use of insulin ([address]    3. Type 2 diabetes mellitus with left diabetic foot infection ([address]    4.  Onychomycosis     No orders of the defined types were placed in

## 2017-11-28 NOTE — PROGRESS NOTES
0.25-3 BY 0.25:83197} mm, dystrophic and crumbly, discolored with subungual debris. Fissures {DESC; PRESENT/ABSENT:66853::\"present\"}, {RIGHT LEFT BILATERAL:07504}. Vascular: DP and PT pulses palpable {NUMBERS 0-4:20826}/4, {RIGHT LEFT BILATERAL:18293}. CFT <{Numbers; 1-5:26343} seconds, {RIGHT LEFT BILATERAL:38122}. Hair growth {DESC; PRESENT/ABSENT:05875::\"present\"} to the level of the digits, {RIGHT LEFT BILATERAL:85076}. Edema {DESC; PRESENT/ABSENT:98564::\"present\"}, {RIGHT LEFT BILATERAL:90820}. Varicosities {DESC; PRESENT/ABSENT:05107::\"present\"}, {RIGHT LEFT BILATERAL:34001}. Erythema {DESC; PRESENT/ABSENT:60229::\"absent\"}, {RIGHT LEFT BILATERAL:71360}    Neurological:  Sensation {DESC; PRESENT/ABSENT:10615::\"present\"} to light touch to level of digits, {RIGHT LEFT BILATERAL:67706}. Protective sensation {DESC; PRESENT/ABSENT:15698::\"present\"} via 5.07/10g [place]-Jonah monofilament {NUMBER 1-10:[id number]}/10 sites, {RIGHT LEFT BILATERAL:00707}. Vibratory sensation {DESC; PRESENT/ABSENT:00974::\"present\"} to 1st MPJ, {RIGHT LEFT BILATERAL:53252}. Musculoskeletal: Muscle strength {Numbers; 1-5:27536}/5, {RIGHT LEFT BILATERAL:21543}. Pain {DESC; PRESENT/ABSENT:97465::\"present\"} upon palpation of ***, {RIGHT LEFT BILATERAL:03830}. {DESC; NORMAL/INCREASED/DECREASED:00086} medial longitudinal arch, {RIGHT LEFT BILATERAL:35281}. Ankle ROM {DESC; NORMAL/INCREASED/DECREASED:48130},{RIGHT LEFT BILATERAL:33931}. 1st MPJ ROM {DESC; NORMAL/INCREASED/DECREASED:60630}, {RIGHT LEFT BILATERAL:76756}. Dorsally contracted digits {DESC; PRESENT/ABSENT:56204::\"present\"} digits {0-5:[id number]::\"5\"}, {RIGHT LEFT BILATERAL:04632}. Asessment: Patient is a 55 y.o. male with:   1. Type 2 diabetes mellitus with diabetic polyneuropathy, without long-term current use of insulin ([address]    2. Type 2 diabetes mellitus with foot ulcer, without long-term current use of insulin ([address]    3.  Type 2 diabetes mellitus with left diabetic foot infection ([facility]        Plan: Patient examined and evaluated. Current condition and treatment options discussed in detail. Advised pt to ***. Verbal and written instructions given to patient. Contact office with any questions/problems/concerns. No orders of the defined types were placed in this encounter. No orders of the defined types were placed in this encounter. RTC in {Numbers; 1-10:01019}{DAYS/WEEKS/MONTHS (D):01855}.     11/28/2017

## 2018-02-05 ENCOUNTER — TELEPHONE (OUTPATIENT)
Dept: PODIATRY | Age: 58
End: 2018-02-05

## 2018-02-26 ENCOUNTER — OFFICE VISIT (OUTPATIENT)
Dept: PODIATRY | Age: 58
End: 2018-02-26
Payer: COMMERCIAL

## 2018-02-26 VITALS
WEIGHT: 220 LBS | SYSTOLIC BLOOD PRESSURE: 135 MMHG | HEIGHT: 76 IN | HEART RATE: 85 BPM | DIASTOLIC BLOOD PRESSURE: 80 MMHG | BODY MASS INDEX: 26.79 KG/M2

## 2018-02-26 DIAGNOSIS — E11.42 TYPE 2 DIABETES MELLITUS WITH DIABETIC POLYNEUROPATHY, WITHOUT LONG-TERM CURRENT USE OF INSULIN (HCC): ICD-10-CM

## 2018-02-26 DIAGNOSIS — B35.1 ONYCHOMYCOSIS: Primary | ICD-10-CM

## 2018-02-26 PROCEDURE — 11721 DEBRIDE NAIL 6 OR MORE: CPT | Performed by: PODIATRIST

## 2018-02-26 RX ORDER — CARBIDOPA/LEVODOPA 25MG-250MG
TABLET ORAL
COMMUNITY
Start: 2018-01-16

## 2018-02-26 ASSESSMENT — ENCOUNTER SYMPTOMS
DIARRHEA: 0
VOMITING: 0
NAUSEA: 0
COLOR CHANGE: 0
CONSTIPATION: 0

## 2018-02-26 NOTE — PROGRESS NOTES
Objective:  General: AAO x 3 in NAD. Derm  Toenail Description  Sites of Onychomycosis Involvement (Check affected area)  [x] [x] [x] [x] [x] [x] [x] [x] [x] [x]  5 4 3 2 1 1 2 3 4 5                          Right                                        Left    Thickness  [x] [x] [x] [x] [x] [x] [x] [x] [x] [x]  5 4 3 2 1 1 2 3 4 5                         Right                                        Left    Dystrophic Changes   [x] [x] [x] [x] [x] [x] [x] [x] [x] [x]  5 4 3 2 1 1 2 3 4 5                         Right                                        Left    Color   [x] [x] [x] [x] [x] [x] [x] [x] [x] [x]  5 4 3 2 1 1 2 3 4 5                          Right                                        Left    Incurvation/Ingrowin   [] [] [] [] [] [] [] [] [] []  5 4 3 2 1 1 2 3 4 5                         Right                                        Left    Inflammation/Pain   [] [] [] [] [] [] [] [] [] []  5 4 3 2 1 1 2 3 4 5                         Right                                        Left       Skin lesion/ulceration Absent . Skin No rashes or nodules noted. .      Vascular:  DP/PT pulses palpable 2/4, Bilateral.    CFT <3 seconds to digits 1-5, Bilateral .   Hair growth present to level of digits, Bilateral.  Edema absent, Bilateral.  Erythema absent, Bilateral.     DM with PVD       []Yes    [x]No    Neurological:  Sensation absent to light touch to level of digits, Bilateral.  Protective sensation absent via 5.07/10g [place]-Jonah monofilament 10/10 sites, Bilateral.  Vibratory sensation absent to 1st MPJ, Bilateral.       Assessment:  65 y.o. male with:  1. Onychomycosis    2.  Type 2 diabetes mellitus with diabetic polyneuropathy, without long-term current use of insulin ([address]       Orders Placed This Encounter   Procedures    HM DIABETES FOOT EXAM    UT DEBRIDEMENT OF NAILS, 6 OR MORE           Q7   []Yes    []No                Q8   [x]Yes    []No                     Q9   []Yes

## 2018-08-01 ENCOUNTER — OFFICE VISIT (OUTPATIENT)
Dept: PODIATRY | Age: 58
End: 2018-08-01
Payer: COMMERCIAL

## 2018-08-01 VITALS
HEART RATE: 89 BPM | WEIGHT: 226 LBS | HEIGHT: 76 IN | DIASTOLIC BLOOD PRESSURE: 83 MMHG | SYSTOLIC BLOOD PRESSURE: 150 MMHG | BODY MASS INDEX: 27.52 KG/M2

## 2018-08-01 DIAGNOSIS — E11.42 TYPE 2 DIABETES MELLITUS WITH DIABETIC POLYNEUROPATHY, WITHOUT LONG-TERM CURRENT USE OF INSULIN (HCC): ICD-10-CM

## 2018-08-01 DIAGNOSIS — B35.1 ONYCHOMYCOSIS: Primary | ICD-10-CM

## 2018-08-01 PROCEDURE — 11721 DEBRIDE NAIL 6 OR MORE: CPT | Performed by: PODIATRIST

## 2018-08-01 PROCEDURE — 99999 PR OFFICE/OUTPT VISIT,PROCEDURE ONLY: CPT | Performed by: PODIATRIST

## 2018-08-02 ASSESSMENT — ENCOUNTER SYMPTOMS
VOMITING: 0
NAUSEA: 0
CONSTIPATION: 0
COLOR CHANGE: 0
DIARRHEA: 0

## 2018-08-02 NOTE — PROGRESS NOTES
Q8   [x]Yes    []No                     Q9   []Yes    []No    Plan:   Pt was evaluated and examined. Patient was given personalized discharge instructions. Nails 1-10 were debrided sharply in length and thickness with a nipper and , without incident. Pt will follow up in 3 months or sooner if any problems arise. Diagnosis was discussed with the pt and all of their questions were answered in detail. Proper foot hygiene and care was discussed with the pt. Informed patient on proper diabetic foot care and importance of tight glycemic control. Patient to check feet daily and contact the office with any questions/problems/concerns. Other comorbidity noted and will be managed by PCP. Diabetic foot examination performed this visit. The exam included neurological sensory exam, a 10-g monofilament and pinprick sensation, vibration using a 128-Hz tuning fork, ankle reflexes, visual skin inspection, vascular exam including assessment of pedal pulses, orthopedic exam for deformities, and shoe inspection. Increased risk factors noted on the diabetic foot exam include decreased sensory exam and peripheral neuropathy. Shoegear inspected and found to be appropriate size and wear.     8/1/2018    Electronically signed by Farooq Henry DPM on 8/2/2018 at 8:12 AM

## 2018-10-30 ENCOUNTER — OFFICE VISIT (OUTPATIENT)
Dept: PODIATRY | Age: 58
End: 2018-10-30
Payer: COMMERCIAL

## 2018-10-30 VITALS — WEIGHT: 226 LBS | HEIGHT: 76 IN | BODY MASS INDEX: 27.52 KG/M2

## 2018-10-30 DIAGNOSIS — Z86.31 PERSONAL HISTORY OF DIABETIC FOOT ULCER: ICD-10-CM

## 2018-10-30 DIAGNOSIS — E11.42 TYPE 2 DIABETES MELLITUS WITH DIABETIC POLYNEUROPATHY, WITHOUT LONG-TERM CURRENT USE OF INSULIN (HCC): ICD-10-CM

## 2018-10-30 DIAGNOSIS — M21.962 FOOT DEFORMITY, LEFT: ICD-10-CM

## 2018-10-30 DIAGNOSIS — B35.1 ONYCHOMYCOSIS: Primary | ICD-10-CM

## 2018-10-30 PROCEDURE — 99999 PR OFFICE/OUTPT VISIT,PROCEDURE ONLY: CPT | Performed by: PODIATRIST

## 2018-10-30 PROCEDURE — 11721 DEBRIDE NAIL 6 OR MORE: CPT | Performed by: PODIATRIST

## 2018-10-30 ASSESSMENT — ENCOUNTER SYMPTOMS
DIARRHEA: 0
COLOR CHANGE: 0
CONSTIPATION: 0
NAUSEA: 0
VOMITING: 0

## 2018-10-30 NOTE — PROGRESS NOTES
[facility]  Return Patient5  Chief Complaint   Patient presents with   Hernandez Check-Up     diabetic foot check    Other     last blood sugar 98       Subjective: Jaja Givens comes to clinic for Check-Up (diabetic foot check) and Other (last blood sugar 98)    he is a diabetic and states that he is  doing well. I saw him in the [address] [place] [address] after an I&D for a DFI. The wound is healed. Pt currently has complaint of thickened, elongated nails that they cannot manage by themselves. Pt's primary care physician is Chloé Doan MD   Pt's last blood sugar was 98 . Lab Results   Component Value Date    LABA1C 5.8 05/01/2017      Complains of numbness in the feet bilat. Past Medical History:   Diagnosis Date    Diabetes mellitus (HCC)     Shoulder bursitis        No Known Allergies  Current Outpatient Prescriptions on File Prior to Visit   Medication Sig Dispense Refill    carbidopa-levodopa (SINEMET)  MG per tablet       Omega-3 Fatty Acids (FISH OIL PO) Take by mouth      metFORMIN (GLUCOPHAGE) 500 MG tablet 500 mg 3 times daily With meals      Probiotic Product (PROBIOTIC PO) Take by mouth      Lancets MISC Use daily 100 each 3    glucose blood VI test strips (ASCENSIA AUTODISC VI;ONE TOUCH ULTRA TEST VI) strip Use with associated glucose meter. Use q day 50 strip 3    Multiple Vitamins-Minerals (THERAPEUTIC MULTIVITAMIN-MINERALS) tablet Take 1 tablet by mouth daily       No current facility-administered medications on file prior to visit. Review of Systems   Constitutional: Negative for chills, diaphoresis, fatigue, fever and unexpected weight change. Cardiovascular: Negative for leg swelling. Gastrointestinal: Negative for constipation, diarrhea, nausea and vomiting. Musculoskeletal: Negative for arthralgias, gait problem and joint swelling. Skin: Positive for wound. Negative for color change, pallor and rash. Neurological: Positive for numbness. Negative for weakness. Objective:  General: AAO x 3 in NAD. Derm  Toenail Description  Sites of Onychomycosis Involvement (Check affected area)  [x] [x] [x] [x] [x] [x] [x] [x] [x] [x]  5 4 3 2 1 1 2 3 4 5                          Right                                        Left    Thickness  [x] [x] [x] [x] [x] [x] [x] [x] [x] [x]  5 4 3 2 1 1 2 3 4 5                         Right                                        Left    Dystrophic Changes   [x] [x] [x] [x] [x] [x] [x] [x] [x] [x]  5 4 3 2 1 1 2 3 4 5                         Right                                        Left    Color   [x] [x] [x] [x] [x] [x] [x] [x] [x] [x]  5 4 3 2 1 1 2 3 4 5                          Right                                        Left    Incurvation/Ingrowin   [] [] [] [] [] [] [] [] [] []  5 4 3 2 1 1 2 3 4 5                         Right                                        Left    Inflammation/Pain   [] [] [] [] [] [] [] [] [] []  5 4 3 2 1 1 2 3 4 5                         Right                                        Left       Skin lesion/ulceration Absent . Skin No rashes or nodules noted. .      Vascular:  DP/PT pulses palpable 2/4, Bilateral.    CFT <3 seconds to digits 1-5, Bilateral .   Hair growth present to level of digits, Bilateral.  Edema absent, Bilateral.  Erythema absent, Bilateral.     DM with PVD       []Yes    [x]No    Neurological:  Sensation absent to light touch to level of digits, Bilateral.  Protective sensation absent via 5.07/10g [place]-Jonah monofilament 10/10 sites, Bilateral.  Vibratory sensation absent to 1st MPJ, Bilateral.       Assessment:  65 y.o. male with:  1. Onychomycosis    2. Type 2 diabetes mellitus with diabetic polyneuropathy, without long-term current use of insulin (HCC)    3. Personal history of diabetic foot ulcer    4.  Foot deformity, left       Orders Placed This Encounter   Procedures    HM DIABETES FOOT EXAM    [place] DEBRIDEMENT OF NAILS, 6 OR MORE           Q7   []Yes    []No

## 2019-02-05 ENCOUNTER — TELEPHONE (OUTPATIENT)
Dept: PODIATRY | Age: 59
End: 2019-02-05

## 2019-05-20 ENCOUNTER — HOSPITAL ENCOUNTER (OUTPATIENT)
Age: 59
Setting detail: SPECIMEN
Discharge: HOME | End: 2019-05-20
Payer: COMMERCIAL

## 2019-05-20 LAB
ALBUMIN SERPL-MCNC: 4.7 G/DL (ref 3.5–5.2)
ALBUMIN/GLOBULIN RATIO: 1.6 (ref 1–2.5)
ALP BLD-CCNC: 48 U/L (ref 40–129)
ALT SERPL-CCNC: <5 U/L (ref 5–41)
ANION GAP SERPL CALCULATED.3IONS-SCNC: 11 MMOL/L (ref 9–17)
AST SERPL-CCNC: 15 U/L
BILIRUB SERPL-MCNC: 1.08 MG/DL (ref 0.3–1.2)
BUN BLDV-MCNC: 20 MG/DL (ref 6–20)
BUN/CREAT BLD: ABNORMAL (ref 9–20)
CALCIUM SERPL-MCNC: 10.1 MG/DL (ref 8.6–10.4)
CHLORIDE BLD-SCNC: 98 MMOL/L (ref 98–107)
CHOLESTEROL, FASTING: 183 MG/DL
CHOLESTEROL/HDL RATIO: 3.8
CO2: 27 MMOL/L (ref 20–31)
CREAT SERPL-MCNC: 0.81 MG/DL (ref 0.7–1.2)
CREATININE URINE: 311.7 MG/DL (ref 39–259)
GFR AFRICAN AMERICAN: >60 ML/MIN
GFR NON-AFRICAN AMERICAN: >60 ML/MIN
GFR SERPL CREATININE-BSD FRML MDRD: ABNORMAL ML/MIN/{1.73_M2}
GFR SERPL CREATININE-BSD FRML MDRD: ABNORMAL ML/MIN/{1.73_M2}
GLUCOSE BLD-MCNC: 125 MG/DL (ref 70–99)
HDLC SERPL-MCNC: 48 MG/DL
LDL CHOLESTEROL: 119 MG/DL (ref 0–130)
MICROALBUMIN/CREAT 24H UR: 25 MG/L
MICROALBUMIN/CREAT UR-RTO: 8 MCG/MG CREAT
POTASSIUM SERPL-SCNC: 5 MMOL/L (ref 3.7–5.3)
SODIUM BLD-SCNC: 136 MMOL/L (ref 135–144)
THYROXINE, FREE: 1.26 NG/DL (ref 0.93–1.7)
TOTAL PROTEIN: 7.7 G/DL (ref 6.4–8.3)
TRIGLYCERIDE, FASTING: 81 MG/DL
TSH SERPL DL<=0.05 MIU/L-ACNC: 2.29 MIU/L (ref 0.3–5)
VLDLC SERPL CALC-MCNC: NORMAL MG/DL (ref 1–30)

## 2019-05-28 LAB
AVERAGE GLUCOSE: NORMAL
HBA1C MFR BLD: 6.3 %

## 2019-12-16 LAB
AVERAGE GLUCOSE: NORMAL
HBA1C MFR BLD: 6.2 %

## 2020-07-17 ENCOUNTER — HOSPITAL ENCOUNTER (OUTPATIENT)
Age: 60
Setting detail: SPECIMEN
Discharge: HOME | End: 2020-07-17
Payer: COMMERCIAL

## 2020-07-17 LAB
ALBUMIN SERPL-MCNC: 4.7 G/DL (ref 3.5–5.2)
ALBUMIN/GLOBULIN RATIO: 1.7 (ref 1–2.5)
ALP BLD-CCNC: 50 U/L (ref 40–129)
ALT SERPL-CCNC: 7 U/L (ref 5–41)
ANION GAP SERPL CALCULATED.3IONS-SCNC: 14 MMOL/L (ref 9–17)
AST SERPL-CCNC: 16 U/L
BILIRUB SERPL-MCNC: 1.29 MG/DL (ref 0.3–1.2)
BUN BLDV-MCNC: 20 MG/DL (ref 6–20)
BUN/CREAT BLD: ABNORMAL (ref 9–20)
CALCIUM SERPL-MCNC: 9.6 MG/DL (ref 8.6–10.4)
CHLORIDE BLD-SCNC: 103 MMOL/L (ref 98–107)
CHOLESTEROL/HDL RATIO: 4
CHOLESTEROL: 185 MG/DL
CO2: 24 MMOL/L (ref 20–31)
CREAT SERPL-MCNC: 0.85 MG/DL (ref 0.7–1.2)
CREATININE URINE: 270.4 MG/DL (ref 39–259)
ESTIMATED AVERAGE GLUCOSE: 143 MG/DL
GFR AFRICAN AMERICAN: >60 ML/MIN
GFR NON-AFRICAN AMERICAN: >60 ML/MIN
GFR SERPL CREATININE-BSD FRML MDRD: ABNORMAL ML/MIN/{1.73_M2}
GFR SERPL CREATININE-BSD FRML MDRD: ABNORMAL ML/MIN/{1.73_M2}
GLUCOSE BLD-MCNC: 124 MG/DL (ref 70–99)
HBA1C MFR BLD: 6.6 % (ref 4–6)
HDLC SERPL-MCNC: 46 MG/DL
LDL CHOLESTEROL: 121 MG/DL (ref 0–130)
MICROALBUMIN/CREAT 24H UR: 13 MG/L
MICROALBUMIN/CREAT UR-RTO: 5 MCG/MG CREAT
POTASSIUM SERPL-SCNC: 4.7 MMOL/L (ref 3.7–5.3)
SODIUM BLD-SCNC: 141 MMOL/L (ref 135–144)
TOTAL PROTEIN: 7.4 G/DL (ref 6.4–8.3)
TRIGL SERPL-MCNC: 91 MG/DL
VLDLC SERPL CALC-MCNC: NORMAL MG/DL (ref 1–30)

## 2020-08-25 ENCOUNTER — TELEPHONE (OUTPATIENT)
Dept: PODIATRY | Age: 60
End: 2020-08-25

## 2020-08-25 ENCOUNTER — OFFICE VISIT (OUTPATIENT)
Dept: PRIMARY CARE CLINIC | Age: 60
End: 2020-08-25
Payer: COMMERCIAL

## 2020-08-25 VITALS
WEIGHT: 225.8 LBS | DIASTOLIC BLOOD PRESSURE: 80 MMHG | TEMPERATURE: 97.9 F | BODY MASS INDEX: 27.49 KG/M2 | SYSTOLIC BLOOD PRESSURE: 136 MMHG

## 2020-08-25 PROCEDURE — 1036F TOBACCO NON-USER: CPT | Performed by: PHYSICIAN ASSISTANT

## 2020-08-25 PROCEDURE — 3044F HG A1C LEVEL LT 7.0%: CPT | Performed by: PHYSICIAN ASSISTANT

## 2020-08-25 PROCEDURE — 2022F DILAT RTA XM EVC RTNOPTHY: CPT | Performed by: PHYSICIAN ASSISTANT

## 2020-08-25 PROCEDURE — 99213 OFFICE O/P EST LOW 20 MIN: CPT | Performed by: PHYSICIAN ASSISTANT

## 2020-08-25 PROCEDURE — G8427 DOCREV CUR MEDS BY ELIG CLIN: HCPCS | Performed by: PHYSICIAN ASSISTANT

## 2020-08-25 PROCEDURE — G8419 CALC BMI OUT NRM PARAM NOF/U: HCPCS | Performed by: PHYSICIAN ASSISTANT

## 2020-08-25 PROCEDURE — 3017F COLORECTAL CA SCREEN DOC REV: CPT | Performed by: PHYSICIAN ASSISTANT

## 2020-08-25 RX ORDER — SULFAMETHOXAZOLE AND TRIMETHOPRIM 800; 160 MG/1; MG/1
1 TABLET ORAL 2 TIMES DAILY
Qty: 20 TABLET | Refills: 0 | Status: SHIPPED | OUTPATIENT
Start: 2020-08-25 | End: 2020-09-04

## 2020-08-25 ASSESSMENT — ENCOUNTER SYMPTOMS
ABDOMINAL PAIN: 0
VOMITING: 0
SORE THROAT: 0
PHOTOPHOBIA: 0
CHEST TIGHTNESS: 0
EYE DISCHARGE: 0
COUGH: 0
SHORTNESS OF BREATH: 0
RHINORRHEA: 0
ABDOMINAL DISTENTION: 0
CONSTIPATION: 0
SINUS PRESSURE: 0
DIARRHEA: 0

## 2020-08-25 ASSESSMENT — PATIENT HEALTH QUESTIONNAIRE - PHQ9
SUM OF ALL RESPONSES TO PHQ9 QUESTIONS 1 & 2: 0
SUM OF ALL RESPONSES TO PHQ QUESTIONS 1-9: 0
SUM OF ALL RESPONSES TO PHQ QUESTIONS 1-9: 0
2. FEELING DOWN, DEPRESSED OR HOPELESS: 0
1. LITTLE INTEREST OR PLEASURE IN DOING THINGS: 0

## 2020-08-25 NOTE — PROGRESS NOTES
Current Outpatient Medications   Medication Sig Dispense Refill    sulfamethoxazole-trimethoprim (BACTRIM DS;SEPTRA DS) 800-160 MG per tablet Take 1 tablet by mouth 2 times daily for 10 days 20 tablet 0    carbidopa-levodopa (SINEMET)  MG per tablet       metFORMIN (GLUCOPHAGE) 500 MG tablet 500 mg 3 times daily With meals      Probiotic Product (PROBIOTIC PO) Take by mouth      Lancets MISC Use daily 100 each 3    glucose blood VI test strips (ASCENSIA AUTODISC VI;ONE TOUCH ULTRA TEST VI) strip Use with associated glucose meter. Use q day 50 strip 3    Multiple Vitamins-Minerals (THERAPEUTIC MULTIVITAMIN-MINERALS) tablet Take 1 tablet by mouth daily      Omega-3 Fatty Acids (FISH OIL PO) Take by mouth       No current facility-administered medications for this visit. No Known Allergies    Health Maintenance   Topic Date Due    Hepatitis C screen  1960    Pneumococcal 0-64 years Vaccine (1 of 1 - PPSV23) 12/18/1967    HIV screen  12/18/1976    Hepatitis B vaccine (1 of 3 - Risk 3-dose series) 12/18/1980    DTaP/Tdap/Td vaccine (1 - Tdap) 12/18/1980    Shingles Vaccine (1 of 2) 12/18/2011    Colon cancer screen colonoscopy  12/18/2011    Diabetic foot exam  10/30/2019    Flu vaccine (1) 09/01/2020    A1C test (Diabetic or Prediabetic)  07/17/2021    Diabetic microalbuminuria test  07/17/2021    Lipid screen  07/17/2021    Potassium monitoring  07/17/2021    Creatinine monitoring  07/17/2021    Diabetic retinal exam  08/07/2021    Hepatitis A vaccine  Aged Out    Hib vaccine  Aged Out    Meningococcal (ACWY) vaccine  Aged Out       Subjective:      Review of Systems   Constitutional: Negative for chills, fever and unexpected weight change. HENT: Negative for congestion, hearing loss, rhinorrhea, sinus pressure and sore throat. Eyes: Negative for photophobia, discharge and visual disturbance. Respiratory: Negative for cough, chest tightness and shortness of breath. Cardiovascular: Negative for chest pain, palpitations and leg swelling. Gastrointestinal: Negative for abdominal distention, abdominal pain, constipation, diarrhea and vomiting. Endocrine: Negative for polydipsia and polyuria. Genitourinary: Negative for decreased urine volume, difficulty urinating, frequency and urgency. Musculoskeletal: Negative for arthralgias, gait problem and myalgias. Skin: Positive for wound (Ulcer on ball of left foot). Negative for rash. Allergic/Immunologic: Negative for food allergies. Neurological: Positive for numbness. Negative for dizziness, weakness and headaches. Hematological: Negative for adenopathy. Psychiatric/Behavioral: Negative for dysphoric mood and sleep disturbance. The patient is not nervous/anxious. Objective:     /80   Temp 97.9 °F (36.6 °C)   Wt 225 lb 12.8 oz (102.4 kg)   BMI 27.49 kg/m²   Physical Exam  Constitutional:       General: He is not in acute distress. Appearance: Normal appearance. He is not ill-appearing. HENT:      Head: Normocephalic and atraumatic. Right Ear: External ear normal.      Left Ear: External ear normal.      Nose: Nose normal.      Mouth/Throat:      Mouth: Mucous membranes are moist.   Eyes:      Extraocular Movements: Extraocular movements intact. Conjunctiva/sclera: Conjunctivae normal.      Pupils: Pupils are equal, round, and reactive to light. Neck:      Musculoskeletal: Normal range of motion and neck supple. Vascular: No carotid bruit. Cardiovascular:      Rate and Rhythm: Normal rate and regular rhythm. Pulses: Normal pulses. Heart sounds: Normal heart sounds. Pulmonary:      Effort: Pulmonary effort is normal. No respiratory distress. Breath sounds: Normal breath sounds. Abdominal:      General: Bowel sounds are normal. There is no distension. Tenderness: There is no abdominal tenderness. Musculoskeletal: Normal range of motion.       Right foot: Bunion present. Left foot: Bunion present. Feet:    Feet:      Right foot:      Protective Sensation: 6 sites tested. 3 sites sensed. Skin integrity: Callus and dry skin present. Toenail Condition: Right toenails are abnormally thick. Left foot:      Protective Sensation: 6 sites tested. 3 sites sensed. Skin integrity: Callus, dry skin and fissure present. Toenail Condition: Left toenails are abnormally thick. Comments: Left foot ulcer with mild tenderness. Lymphadenopathy:      Cervical: No cervical adenopathy. Skin:     General: Skin is warm and dry. Neurological:      General: No focal deficit present. Mental Status: He is alert and oriented to person, place, and time. Psychiatric:         Mood and Affect: Mood normal.         Behavior: Behavior normal.         Thought Content: Thought content normal.         Assessment:       Diagnosis Orders   1. Type 2 diabetes mellitus with foot ulcer, without long-term current use of insulin ([facility])  sulfamethoxazole-trimethoprim (BACTRIM DS;SEPTRA DS) 800-160 MG per tablet     DIABETES FOOT EXAM    XR FOOT LEFT (2 VIEWS)   2. Type 2 diabetes mellitus with left diabetic foot infection (HCC)  sulfamethoxazole-trimethoprim (BACTRIM DS;SEPTRA DS) 800-160 MG per tablet    XR FOOT LEFT (2 VIEWS)    CBC Auto Differential   3. Type 2 diabetes mellitus with diabetic polyneuropathy, without long-term current use of insulin ([facility])   DIABETES FOOT EXAM    XR FOOT LEFT (2 VIEWS)        Plan:       X ray   Blood work   Bactrim ordered. Call podiatry and let them know he was here   Patient educated to keep pressure off ulcer while it heals. Keep clean and dry. Use antibiotic cream and apply lotion daily. Inspect feet daily. Call office if not improving in first couple days of ATB. Watch for fever or signs of infection such as discharge. Return for Follow up if symptoms persist or worsen.     Orders Placed This Encounter Procedures    XR FOOT LEFT (2 VIEWS)     Standing Status:   Future     Standing Expiration Date:   8/25/2021     Order Specific Question:   Reason for exam:     Answer:   left foot ulcer.  CBC Auto Differential     Standing Status:   Future     Standing Expiration Date:   8/26/2021     DIABETES FOOT EXAM     Orders Placed This Encounter   Medications    sulfamethoxazole-trimethoprim (BACTRIM DS;SEPTRA DS) 800-160 MG per tablet     Sig: Take 1 tablet by mouth 2 times daily for 10 days     Dispense:  20 tablet     Refill:  0       Patient given educationalmaterials - see patient instructions. Discussed use, benefit, and side effectsof prescribed medications. All patient questions answered. Pt voiced understanding. Reviewed health maintenance. Instructed to continue current medications, diet andexercise. Patient agreed with treatment plan. Follow up as directed.      Electronicallysigned by RENETTA Soriano on 8/25/2020 at 3:29 PM

## 2020-08-25 NOTE — TELEPHONE ENCOUNTER
Patient was on the answering machine because he is concerned about his foot. Called patient back but had to leave a message.

## 2020-09-17 ENCOUNTER — OFFICE VISIT (OUTPATIENT)
Dept: PODIATRY | Age: 60
End: 2020-09-17
Payer: COMMERCIAL

## 2020-09-17 VITALS — WEIGHT: 230 LBS | BODY MASS INDEX: 27.16 KG/M2 | HEIGHT: 77 IN

## 2020-09-17 PROCEDURE — 11042 DBRDMT SUBQ TIS 1ST 20SQCM/<: CPT | Performed by: PODIATRIST

## 2020-09-17 PROCEDURE — 99214 OFFICE O/P EST MOD 30 MIN: CPT | Performed by: PODIATRIST

## 2020-09-17 PROCEDURE — 1036F TOBACCO NON-USER: CPT | Performed by: PODIATRIST

## 2020-09-17 PROCEDURE — 3017F COLORECTAL CA SCREEN DOC REV: CPT | Performed by: PODIATRIST

## 2020-09-17 PROCEDURE — G8419 CALC BMI OUT NRM PARAM NOF/U: HCPCS | Performed by: PODIATRIST

## 2020-09-17 PROCEDURE — 2022F DILAT RTA XM EVC RTNOPTHY: CPT | Performed by: PODIATRIST

## 2020-09-17 PROCEDURE — G8427 DOCREV CUR MEDS BY ELIG CLIN: HCPCS | Performed by: PODIATRIST

## 2020-09-17 PROCEDURE — 3044F HG A1C LEVEL LT 7.0%: CPT | Performed by: PODIATRIST

## 2020-09-18 NOTE — PROGRESS NOTES
[facility]  New Patient History and Physical      Glynn Johnson  AGE: 63 y.o. GENDER: male  : 1960  TODAY'S DATE:  2020    Chief Complaint:   Chief Complaint   Patient presents with    Wound Check     wound left foot patient noticed about 2 weeks ago     Diabetes     blood sugar 117    Other     diabetic shoes           History of the Present Illness       Glynn Johnson is a 63 y.o. male who presents today for evaluation and treatment for a diabetic foot ulcer  wound which is located on the left    History of Wound: started around . Had pain. Has diabetic shoes, insoles, over a years old, getting very worn. Started with pain, some drainage. History of DFI, 2017, seen at wound care. Wound Type:diabetic  Wound Location:left foot  Modifying factors:diabetes, chronic pressure and shear force    Lab Results   Component Value Date    LABA1C 6.6 (H) 2020                 Abx : Yes   Cultures :were notobtained  Pain : 1/10    PAST MEDICAL HISTORY        Diagnosis Date    Diabetes mellitus ([address]     Shoulder bursitis        MEDICATIONS    Current Outpatient Medications on File Prior to Visit   Medication Sig Dispense Refill    carbidopa-levodopa (SINEMET)  MG per tablet       metFORMIN (GLUCOPHAGE) 500 MG tablet 500 mg 3 times daily With meals      Probiotic Product (PROBIOTIC PO) Take by mouth      Lancets MISC Use daily 100 each 3    glucose blood VI test strips (ASCENSIA AUTODISC VI;ONE TOUCH ULTRA TEST VI) strip Use with associated glucose meter. Use q day 50 strip 3    Multiple Vitamins-Minerals (THERAPEUTIC MULTIVITAMIN-MINERALS) tablet Take 1 tablet by mouth daily       No current facility-administered medications on file prior to visit.         ALLERGIES    No Known Allergies    PAST SURGICAL HISTORY    Past Surgical History:   Procedure Laterality Date    NC DEEP DISSEC FOOT INFEC,1 BURSA Left 2017     INCISION AND DRAINAGE FOOT performed by Shawn Gomez Cal Maldonado, DPM at [address]    family history includes Cancer in his sister; Diabetes in an other family member; Heart Disease in his mother; High Blood Pressure in his mother. SOCIAL HISTORY    Social History     Tobacco Use    Smoking status: Former Smoker     Packs/day: 2.00     Years: 10.00     Pack years: 20.00     Types: Cigarettes     Last attempt to quit: 1975     Years since quittin.0    Smokeless tobacco: Never Used   Substance Use Topics    Alcohol use: Yes     Comment: occasionally    Drug use: Never       Review of Systems    Objective:      Ht 6' 5\" (1.956 m)   Wt 230 lb (104.3 kg)   BMI 27.27 kg/m²   General Appearance: alert and oriented to person, place and time, well-developed and well-nourished, in no acute distress    Wound:         Wound #:   1    diabetic foot ulcer   left foot    Wound measurements:  #1  1 cm by 0.5 cm  by   2 mm         Wound Depth: subcutaneous. Drainage:    minimal Type:serosanguinous    Wound Bed status:   Granulation Tissue: 70%   Necrotic 30%  Type: slough  Epithelialization 0%   Hypergranular 0%      Erythema absent       Odor:no    Maceration:yes    Undermining/tract/tunneling:no    Culture taken:   no           Integument:  Warm, dry, supple, Bilateral.      Vascular:  DP/PT pulses palpable 2/4, Bilateral.    CFT <3 seconds to digits 1-5, Bilateral .   Hair growth absent to level of digits, Bilateral.  Edema absent, Bilateral.  Erythema absent, Left. Neurological:  Sensation absent to light touch to level of digits, Bilateral.  Protective sensation  absent via 5.07/10g [place]-Jon[place] monofilament 5/10 sites, Bilateral.  Vibratory sensation absent to 1st MPJ, Bilateral.     Musculoskeletal:  Muscle strength 5/5 all LE groups tested, Bilateral.         Assessment:         1. Ulcerated, foot, left, with fat layer exposed ([address]    2.  Type 2 diabetes mellitus with foot ulcer, without long-term current use of insulin ([address] 3. Foot deformity, left    4. Type 2 diabetes mellitus with diabetic polyneuropathy, without long-term current use of insulin ([facility]          Plan:   Julienne Esposito Age:  63 y.o. Gender:  male   :  1960  Today's Date:  2020      Procedure: With the patient in supine position, Lidocaine soaked gauze was applied per physician order at beginning of wound evaluation. It was subsequently removed. Using a curette, #15 blade scalpel and Pick-up, the wound was debrided down to and included the removal of the following tissue:     [] epidermis, [] dermis, [x]  subcutaneous tissue,  [] muscle/fascia tissue,   and/or  [] bone     Also debrided was all  [x] fibrin, [x] biofilm, [x] slough,  [] necrotic,  [] hypergranulation tissue, and/or  [x] hyperkeratotic tissue. Wound was copiously irrigated with normal saline solution. Bleeding:  Minimal.  Hemostasis:  pressure      100%  of wound debrided. Patient tolerated procedure well. Pain 0 / 10 during procedure and pain 0 / 10 after procedure. Plan for wound  Dress per physician order  Treatment: lamonte daily  Modified his custom insole  Will need new shoes and insoles    Diabetic shoes and insoles: This patient would benefit from extra depth diabetic shoes and custom inserts. I feel he/she qualifies due to the above performed physical exam and diagnosis. I will do the appropriate paperwork and send it to their primary care physician. Then the patient will be measured for shoes and custom inserts to properly off load and protect his/her feet. Diabetic foot examination performed this visit. The exam included neurological sensory exam, a 10-g monofilament and pinprick sensation, vibration using a 128-Hz tuning fork, ankle reflexes, visual skin inspection, vascular exam including assessment of pedal pulses, orthopedic exam for deformities, and shoe inspection.   Increased risk factors noted on the diabetic foot exam include decreased sensory exam and peripheral neuropathy. Shoegear inspected and found to be appropriate size and wear. 1. Discussed appropriate home care of this wound. 2. Dressings:   Orders Placed This Encounter   Procedures    WI DEBRIDEMENT, SKIN, SUB-Q TISSUE,=<20 SQ CM      3. Follow up: 1 week. 4. Detailed home instructions and education material given to patient prior to discharge. Electronically signed by Erica Edwards DPM on 9/18/2020 at 6:57 AM  9/17/2020    Dressing Documentation    This patient needs a dressings to aid in healing of the ulceration.      I am prescribing lamonte, as the primary dressing  Sequence of dressing: lamonte, 4x4 gauze, tape  Frequency of change: daily  Duration of supplies: 30 days    1) The ulceration has been surgically debrided on 09/18/20   2) The ulceration is located on the left foot  3) The ulceration was assessed on 09/18/20   4) Type of ulceration:  diabetic  5) See above for size of ulceration(s)  6) Amount of drainage: minimal

## 2020-10-01 ENCOUNTER — OFFICE VISIT (OUTPATIENT)
Dept: PODIATRY | Age: 60
End: 2020-10-01
Payer: COMMERCIAL

## 2020-10-01 VITALS — WEIGHT: 230 LBS | HEIGHT: 77 IN | BODY MASS INDEX: 27.16 KG/M2

## 2020-10-01 PROCEDURE — 11042 DBRDMT SUBQ TIS 1ST 20SQCM/<: CPT | Performed by: PODIATRIST

## 2020-10-01 NOTE — PROGRESS NOTES
HISTORY    family history includes Cancer in his sister; Diabetes in an other family member; Heart Disease in his mother; High Blood Pressure in his mother. SOCIAL HISTORY    Social History     Tobacco Use    Smoking status: Former Smoker     Packs/day: 2.00     Years: 10.00     Pack years: 20.00     Types: Cigarettes     Last attempt to quit: 1975     Years since quittin.1    Smokeless tobacco: Never Used   Substance Use Topics    Alcohol use: Yes     Comment: occasionally    Drug use: Never       Review of Systems    Objective:      Ht 6' 5\" (1.956 m)   Wt 230 lb (104.3 kg)   BMI 27.27 kg/m²   General Appearance: alert and oriented to person, place and time, well-developed and well-nourished, in no acute distress    Wound:         Wound #:   1    diabetic foot ulcer   left foot    Wound measurements:  #1  0.3 cm by 0.3 cm  by   2 mm         Wound Depth: subcutaneous. Drainage:    minimal Type:serosanguinous    Wound Bed status:   Granulation Tissue: 70%   Necrotic 30%  Type: slough  Epithelialization 0%   Hypergranular 0%      Erythema absent       Odor:no    Maceration:yes    Undermining/tract/tunneling:no    Culture taken:   no           Integument:  Warm, dry, supple, Bilateral.      Vascular:  DP/PT pulses palpable 2/4, Bilateral.    CFT <3 seconds to digits 1-5, Bilateral .   Hair growth absent to level of digits, Bilateral.  Edema absent, Bilateral.  Erythema absent, Left. Neurological:  Sensation absent to light touch to level of digits, Bilateral.  Protective sensation  absent via 5.07/10g [place]-Jonah monofilament 5/10 sites, Bilateral.  Vibratory sensation absent to 1st MPJ, Bilateral.     Musculoskeletal:  Muscle strength 5/5 all LE groups tested, Bilateral.         Assessment:         1. Ulcerated, foot, left, with fat layer exposed ([address]    2. Type 2 diabetes mellitus with foot ulcer, without long-term current use of insulin ([address]    3. Foot deformity, left    4.  Type 2 Shoegear inspected and found to be appropriate size and wear. 1. Discussed appropriate home care of this wound. 2. Dressings:   Orders Placed This Encounter   Procedures    VA DEBRIDEMENT, SKIN, SUB-Q TISSUE,=<20 SQ CM      3. Follow up: 1 week. 4. Detailed home instructions and education material given to patient prior to discharge. Electronically signed by Jenny Hall DPM on 10/1/2020 at 5:09 PM  10/1/2020    Dressing Documentation    This patient needs a dressings to aid in healing of the ulceration.      I am prescribing lamonte, as the primary dressing  Sequence of dressing: lamonte, 4x4 gauze, tape  Frequency of change: daily  Duration of supplies: 30 days    1) The ulceration has been surgically debrided on 10/01/20   2) The ulceration is located on the left foot  3) The ulceration was assessed on 10/01/20   4) Type of ulceration:  diabetic  5) See above for size of ulceration(s)  6) Amount of drainage: minimal

## 2020-10-22 ENCOUNTER — OFFICE VISIT (OUTPATIENT)
Dept: PODIATRY | Age: 60
End: 2020-10-22
Payer: COMMERCIAL

## 2020-10-22 VITALS — BODY MASS INDEX: 27.16 KG/M2 | WEIGHT: 230 LBS | HEIGHT: 77 IN

## 2020-10-22 PROCEDURE — 11042 DBRDMT SUBQ TIS 1ST 20SQCM/<: CPT | Performed by: PODIATRIST

## 2020-10-22 PROCEDURE — 99999 PR OFFICE/OUTPT VISIT,PROCEDURE ONLY: CPT | Performed by: PODIATRIST

## 2020-10-22 NOTE — PROGRESS NOTES
[facility][1]  Return Patient History and Physical      Jazzy Acevedo  AGE: 63 y.o. GENDER: male  : 1960  TODAY'S DATE:  10/22/2020    Chief Complaint:   Chief Complaint   Patient presents with    Wound Check     left foot     Other     last blood sugar 111          History of the Present Illness       Jazzy Acevedo is a 63 y.o. male who presents today for evaluation and treatment for a diabetic foot ulcer  wound which is located on the left    History of Wound: started around . Had pain. Has diabetic shoes, insoles, over a years old, getting very worn. Started with pain, some drainage. History of DFI, 2017, seen at wound care. Wound Type:diabetic  Wound Location:left foot  Modifying factors:diabetes, chronic pressure and shear force    Lab Results   Component Value Date    LABA1C 6.6 (H) 2020                 Abx : Yes   Cultures :were notobtained  Pain : 1/10    PAST MEDICAL HISTORY        Diagnosis Date    Diabetes mellitus ([address]     Shoulder bursitis        MEDICATIONS    Current Outpatient Medications on File Prior to Visit   Medication Sig Dispense Refill    carbidopa-levodopa (SINEMET)  MG per tablet       metFORMIN (GLUCOPHAGE) 500 MG tablet 500 mg 3 times daily With meals      Probiotic Product (PROBIOTIC PO) Take by mouth      Lancets MISC Use daily 100 each 3    glucose blood VI test strips (ASCENSIA AUTODISC VI;ONE TOUCH ULTRA TEST VI) strip Use with associated glucose meter. Use q day 50 strip 3    Multiple Vitamins-Minerals (THERAPEUTIC MULTIVITAMIN-MINERALS) tablet Take 1 tablet by mouth daily       No current facility-administered medications on file prior to visit.         ALLERGIES    No Known Allergies    PAST SURGICAL HISTORY    Past Surgical History:   Procedure Laterality Date    CO DEEP DISSEC FOOT INFEC,1 BURSA Left 2017     INCISION AND DRAINAGE FOOT performed by Shmuel Joshi DPM at [address] with diabetic polyneuropathy, without long-term current use of insulin ([facility] [address]          Plan:   Cj Taveras Age:  63 y.o. Gender:  male   :  1960  Today's Date:  10/22/2020      Procedure: With the patient in supine position, Lidocaine soaked gauze was applied per physician order at beginning of wound evaluation. It was subsequently removed. Using a curette, #15 blade scalpel and Pick-up, the wound was debrided down to and included the removal of the following tissue:     [] epidermis, [] dermis, [x]  subcutaneous tissue,  [] muscle/fascia tissue,   and/or  [] bone     Also debrided was all  [x] fibrin, [x] biofilm, [x] slough,  [] necrotic,  [] hypergranulation tissue, and/or  [x] hyperkeratotic tissue. Wound was copiously irrigated with normal saline solution. Bleeding:  Minimal.  Hemostasis:  pressure      100%  of wound debrided. Patient tolerated procedure well. Pain 0 / 10 during procedure and pain 0 / 10 after procedure. Plan for wound  Dress per physician order  Treatment: lamonte daily  Modified custom insole  Will need new shoes and insoles    Diabetic shoes and insoles: This patient would benefit from extra depth diabetic shoes and custom inserts. I feel he/she qualifies due to the above performed physical exam and diagnosis. I will do the appropriate paperwork and send it to their primary care physician. Then the patient will be measured for shoes and custom inserts to properly off load and protect his/her feet. Diabetic foot examination performed this visit. The exam included neurological sensory exam, a 10-g monofilament and pinprick sensation, vibration using a 128-Hz tuning fork, ankle reflexes, visual skin inspection, vascular exam including assessment of pedal pulses, orthopedic exam for deformities, and shoe inspection. Increased risk factors noted on the diabetic foot exam include decreased sensory exam and peripheral neuropathy.   Shoegear inspected and found to be appropriate size and wear. 1. Discussed appropriate home care of this wound. 2. Dressings:   Orders Placed This Encounter   Procedures    AZ DEBRIDEMENT, SKIN, SUB-Q TISSUE,=<20 SQ CM      3. Follow up: 1 week. 4. Detailed home instructions and education material given to patient prior to discharge. Electronically signed by Arnie Forde DPM on 10/22/2020 at 4:56 PM  10/22/2020    Dressing Documentation    This patient needs a dressings to aid in healing of the ulceration.      I am prescribing lamonte, as the primary dressing  Sequence of dressing: lamonte, 4x4 gauze, tape  Frequency of change: daily  Duration of supplies: 30 days    1) The ulceration has been surgically debrided on 10/22/20   2) The ulceration is located on the left foot  3) The ulceration was assessed on 10/22/20   4) Type of ulceration:  diabetic  5) See above for size of ulceration(s)  6) Amount of drainage: minimal

## 2020-11-05 ENCOUNTER — OFFICE VISIT (OUTPATIENT)
Dept: PODIATRY | Age: 60
End: 2020-11-05
Payer: COMMERCIAL

## 2020-11-05 VITALS — HEIGHT: 77 IN | BODY MASS INDEX: 27.16 KG/M2 | WEIGHT: 230 LBS

## 2020-11-05 PROCEDURE — A5513 MULTI DEN INSERT CUSTOM MOLD: HCPCS | Performed by: PODIATRIST

## 2020-11-05 PROCEDURE — 99213 OFFICE O/P EST LOW 20 MIN: CPT | Performed by: PODIATRIST

## 2020-11-05 PROCEDURE — 1036F TOBACCO NON-USER: CPT | Performed by: PODIATRIST

## 2020-11-05 PROCEDURE — G8419 CALC BMI OUT NRM PARAM NOF/U: HCPCS | Performed by: PODIATRIST

## 2020-11-05 PROCEDURE — 3017F COLORECTAL CA SCREEN DOC REV: CPT | Performed by: PODIATRIST

## 2020-11-05 PROCEDURE — 2022F DILAT RTA XM EVC RTNOPTHY: CPT | Performed by: PODIATRIST

## 2020-11-05 PROCEDURE — A5500 DIAB SHOE FOR DENSITY INSERT: HCPCS | Performed by: PODIATRIST

## 2020-11-05 PROCEDURE — G8427 DOCREV CUR MEDS BY ELIG CLIN: HCPCS | Performed by: PODIATRIST

## 2020-11-05 PROCEDURE — G8484 FLU IMMUNIZE NO ADMIN: HCPCS | Performed by: PODIATRIST

## 2020-11-05 PROCEDURE — 3044F HG A1C LEVEL LT 7.0%: CPT | Performed by: PODIATRIST

## 2020-11-05 NOTE — PROGRESS NOTES
[facility]  Return Patient History and Physical      Renita Nixon  AGE: 63 y.o. GENDER: male  : 1960  TODAY'S DATE:  2020    Chief Complaint:   Chief Complaint   Patient presents with    Wound Check     Left Foot     Diabetes     Last Blood Sugar 114          History of the Present Illness       Renita Nixon is a 63 y.o. male who presents today for evaluation and treatment for a diabetic foot ulcer  wound which is located on the left, doing well, no drainage. . New shoes today. History of Wound: started around . Had pain. Has diabetic shoes, insoles, over a years old, getting very worn. Started with pain, some drainage. History of DFI, 2017, seen at wound care. Wound Type:diabetic  Wound Location:left foot  Modifying factors:diabetes, chronic pressure and shear force    Lab Results   Component Value Date    LABA1C 6.6 (H) 2020                 Abx : Yes   Cultures :were notobtained  Pain : 1/10    PAST MEDICAL HISTORY        Diagnosis Date    Diabetes mellitus ([address]     Shoulder bursitis        MEDICATIONS    Current Outpatient Medications on File Prior to Visit   Medication Sig Dispense Refill    carbidopa-levodopa (SINEMET)  MG per tablet       metFORMIN (GLUCOPHAGE) 500 MG tablet 500 mg 3 times daily With meals      Probiotic Product (PROBIOTIC PO) Take by mouth      Lancets MISC Use daily 100 each 3    glucose blood VI test strips (ASCENSIA AUTODISC VI;ONE TOUCH ULTRA TEST VI) strip Use with associated glucose meter. Use q day 50 strip 3    Multiple Vitamins-Minerals (THERAPEUTIC MULTIVITAMIN-MINERALS) tablet Take 1 tablet by mouth daily       No current facility-administered medications on file prior to visit.         ALLERGIES    No Known Allergies    PAST SURGICAL HISTORY    Past Surgical History:   Procedure Laterality Date    [place] DEEP DISSEC FOOT INFEC,1 BURSA Left 2017     INCISION AND DRAINAGE FOOT performed by Lucy MATTHEW Marie Sheridan, CLAYM at [address]    family history includes Cancer in his sister; Diabetes in an other family member; Heart Disease in his mother; High Blood Pressure in his mother. SOCIAL HISTORY    Social History     Tobacco Use    Smoking status: Former Smoker     Packs/day: 2.00     Years: 10.00     Pack years: 20.00     Types: Cigarettes     Last attempt to quit: 1975     Years since quittin.2    Smokeless tobacco: Never Used   Substance Use Topics    Alcohol use: Yes     Comment: occasionally    Drug use: Never       Review of Systems    Objective:      Ht 6' 5\" (1.956 m)   Wt 230 lb (104.3 kg)   BMI 27.27 kg/m²   General Appearance: alert and oriented to person, place and time, well-developed and well-nourished, in no acute distress    Wound:         Wound #:   1    diabetic foot ulcer   left foot    Wound measurements:  #1  healed    Integument:  Warm, dry, supple, Bilateral.      Vascular:  DP/PT pulses palpable 2/4, Bilateral.    CFT <3 seconds to digits 1-5, Bilateral .   Hair growth absent to level of digits, Bilateral.  Edema absent, Bilateral.  Erythema absent, Left. Neurological:  Sensation absent to light touch to level of digits, Bilateral.  Protective sensation  absent via 5.07/10g Sasabe-Jonah monofilament 5/10 sites, Bilateral.  Vibratory sensation absent to 1st MPJ, Bilateral.     Musculoskeletal:  Muscle strength 5/5 all LE groups tested, Bilateral.         Assessment:         1. Ulcerated, foot, left, with fat layer exposed ([address]    2. Type 2 diabetes mellitus with foot ulcer, without long-term current use of insulin ([address]    3. Type 2 diabetes mellitus with diabetic polyneuropathy, without long-term current use of insulin ([address]    4. Foot deformity, left          Plan:   Suman Alicea Age:  63 y.o.   Gender:  male   :  1960  Today's Date:  2020        Plan for wound  Dress per physician order  Treatment: cover for protection daily  Modified custom insole  new shoes and insoles    Diabetic shoes and insoles: This patient would benefit from extra depth diabetic shoes and custom inserts. I feel he/she qualifies due to the above performed physical exam and diagnosis. I will do the appropriate paperwork and send it to their primary care physician. Then the patient will be measured for shoes and custom inserts to properly off load and protect his/her feet. Diabetic foot examination performed this visit. The exam included neurological sensory exam, a 10-g monofilament and pinprick sensation, vibration using a 128-Hz tuning fork, ankle reflexes, visual skin inspection, vascular exam including assessment of pedal pulses, orthopedic exam for deformities, and shoe inspection. Increased risk factors noted on the diabetic foot exam include decreased sensory exam and peripheral neuropathy. Shoegear inspected and found to be appropriate size and wear. 1. Discussed appropriate home care of this wound. 2. Dressings:   No orders of the defined types were placed in this encounter. 3. Follow up: 2 week. 4. Detailed home instructions and education material given to patient prior to discharge. Electronically signed by Tate Chiu DPM on 11/5/2020 at 4:42 PM  11/5/2020    Dressing Documentation    This patient needs a dressings to aid in healing of the ulceration.      I am prescribing lamonte, as the primary dressing  Sequence of dressing: lamonte, 4x4 gauze, tape  Frequency of change: daily  Duration of supplies: 30 days    1) The ulceration has been surgically debrided on 11/05/20   2) The ulceration is located on the left foot  3) The ulceration was assessed on 11/05/20   4) Type of ulceration:  diabetic  5) See above for size of ulceration(s)  6) Amount of drainage: minimal

## 2020-12-01 ENCOUNTER — OFFICE VISIT (OUTPATIENT)
Dept: PODIATRY | Age: 60
End: 2020-12-01
Payer: COMMERCIAL

## 2020-12-01 VITALS — HEIGHT: 77 IN | BODY MASS INDEX: 27.16 KG/M2 | WEIGHT: 230 LBS

## 2020-12-01 PROCEDURE — G8484 FLU IMMUNIZE NO ADMIN: HCPCS | Performed by: PODIATRIST

## 2020-12-01 PROCEDURE — 2022F DILAT RTA XM EVC RTNOPTHY: CPT | Performed by: PODIATRIST

## 2020-12-01 PROCEDURE — 3044F HG A1C LEVEL LT 7.0%: CPT | Performed by: PODIATRIST

## 2020-12-01 PROCEDURE — G8419 CALC BMI OUT NRM PARAM NOF/U: HCPCS | Performed by: PODIATRIST

## 2020-12-01 PROCEDURE — G8427 DOCREV CUR MEDS BY ELIG CLIN: HCPCS | Performed by: PODIATRIST

## 2020-12-01 PROCEDURE — 1036F TOBACCO NON-USER: CPT | Performed by: PODIATRIST

## 2020-12-01 PROCEDURE — 99213 OFFICE O/P EST LOW 20 MIN: CPT | Performed by: PODIATRIST

## 2020-12-01 PROCEDURE — 3017F COLORECTAL CA SCREEN DOC REV: CPT | Performed by: PODIATRIST

## 2020-12-01 NOTE — PROGRESS NOTES
[facility]  Return Patient History and Physical      Renita Nixon  AGE: 63 y.o. GENDER: male  : 1960  TODAY'S DATE:  2020    Chief Complaint:   Chief Complaint   Patient presents with    Wound Check     left foot    Other     last blood sugar 116          History of the Present Illness       Renita Nixon is a 63 y.o. male who presents today for evaluation and treatment for a diabetic foot ulcer  wound which is located on the left, he is not wearing his new shoes and insoles. No drainage. No pain, getting a callous. . History of Wound: started around . Had pain. Has diabetic shoes, insoles, over a years old, getting very worn. Started with pain, some drainage. History of DFI, 2017, seen at wound care. Wound Type:diabetic  Wound Location:left foot  Modifying factors:diabetes, chronic pressure and shear force    Lab Results   Component Value Date    LABA1C 6.6 (H) 2020                 Abx : Yes   Cultures :were notobtained  Pain : 1/10    PAST MEDICAL HISTORY        Diagnosis Date    Diabetes mellitus ([address]     Shoulder bursitis        MEDICATIONS    Current Outpatient Medications on File Prior to Visit   Medication Sig Dispense Refill    carbidopa-levodopa (SINEMET)  MG per tablet       metFORMIN (GLUCOPHAGE) 500 MG tablet 500 mg 3 times daily With meals      Probiotic Product (PROBIOTIC PO) Take by mouth      Lancets MISC Use daily 100 each 3    glucose blood VI test strips (ASCENSIA AUTODISC VI;ONE TOUCH ULTRA TEST VI) strip Use with associated glucose meter. Use q day 50 strip 3    Multiple Vitamins-Minerals (THERAPEUTIC MULTIVITAMIN-MINERALS) tablet Take 1 tablet by mouth daily       No current facility-administered medications on file prior to visit.         ALLERGIES    No Known Allergies    PAST SURGICAL HISTORY    Past Surgical History:   Procedure Laterality Date    [place] DEEP DISSEC FOOT INFEC,1 BURSA Left 2017     INCISION AND per physician order  Treatment: cover for protection daily  Instructed him to start wearing his new shoes and insoles    Diabetic shoes and insoles: This patient would benefit from extra depth diabetic shoes and custom inserts. I feel he/she qualifies due to the above performed physical exam and diagnosis. I will do the appropriate paperwork and send it to their primary care physician. Then the patient will be measured for shoes and custom inserts to properly off load and protect his/her feet. Diabetic foot examination performed this visit. The exam included neurological sensory exam, a 10-g monofilament and pinprick sensation, vibration using a 128-Hz tuning fork, ankle reflexes, visual skin inspection, vascular exam including assessment of pedal pulses, orthopedic exam for deformities, and shoe inspection. Increased risk factors noted on the diabetic foot exam include decreased sensory exam and peripheral neuropathy. Shoegear inspected and found to be appropriate size and wear. 1. Discussed appropriate home care of this wound. 2. Dressings:   No orders of the defined types were placed in this encounter. 3. Follow up: 1 month  4. Detailed home instructions and education material given to patient prior to discharge.      Electronically signed by Naveen Wilcox DPM on 12/2/2020 at 7:06 AM  12/1/2020

## 2021-01-25 LAB
AVERAGE GLUCOSE: NORMAL
HBA1C MFR BLD: 6.8 %

## 2021-07-20 ENCOUNTER — HOSPITAL ENCOUNTER (OUTPATIENT)
Age: 61
Discharge: HOME | End: 2021-07-20
Payer: COMMERCIAL

## 2021-07-20 LAB
ALBUMIN SERPL-MCNC: 4.7 G/DL (ref 3.5–5.2)
ALBUMIN/GLOBULIN RATIO: ABNORMAL (ref 1–2.5)
ALP BLD-CCNC: 61 U/L (ref 40–129)
ALT SERPL-CCNC: <5 U/L (ref 5–41)
ANION GAP SERPL CALCULATED.3IONS-SCNC: 10 MMOL/L (ref 9–17)
AST SERPL-CCNC: 14 U/L
BILIRUB SERPL-MCNC: 1.08 MG/DL (ref 0.3–1.2)
BUN BLDV-MCNC: 18 MG/DL (ref 6–20)
BUN/CREAT BLD: ABNORMAL (ref 9–20)
CALCIUM SERPL-MCNC: 9.8 MG/DL (ref 8.6–10.4)
CHLORIDE BLD-SCNC: 102 MMOL/L (ref 98–107)
CHOLESTEROL/HDL RATIO: 4.3
CHOLESTEROL: 203 MG/DL
CO2: 28 MMOL/L (ref 20–31)
CREAT SERPL-MCNC: 0.8 MG/DL (ref 0.7–1.2)
CREATININE URINE: 405.4 MG/DL (ref 39–259)
ESTIMATED AVERAGE GLUCOSE: 140 MG/DL
GFR AFRICAN AMERICAN: >60 ML/MIN
GFR NON-AFRICAN AMERICAN: >60 ML/MIN
GFR SERPL CREATININE-BSD FRML MDRD: ABNORMAL ML/MIN/{1.73_M2}
GFR SERPL CREATININE-BSD FRML MDRD: ABNORMAL ML/MIN/{1.73_M2}
GLUCOSE BLD-MCNC: 143 MG/DL (ref 70–99)
HBA1C MFR BLD: 6.5 % (ref 4–6)
HDLC SERPL-MCNC: 47 MG/DL
LDL CHOLESTEROL: 134 MG/DL (ref 0–130)
MICROALBUMIN/CREAT 24H UR: 21 MG/L
MICROALBUMIN/CREAT UR-RTO: 5 MCG/MG CREAT
POTASSIUM SERPL-SCNC: 4.5 MMOL/L (ref 3.7–5.3)
SODIUM BLD-SCNC: 140 MMOL/L (ref 135–144)
T3 FREE: 2.66 PG/ML (ref 2.02–4.43)
THYROXINE, FREE: 1.21 NG/DL (ref 0.93–1.7)
TOTAL PROTEIN: 7.7 G/DL (ref 6.4–8.3)
TRIGL SERPL-MCNC: 111 MG/DL
TSH SERPL DL<=0.05 MIU/L-ACNC: 1.68 MIU/L (ref 0.3–5)
VLDLC SERPL CALC-MCNC: ABNORMAL MG/DL (ref 1–30)

## 2021-07-20 PROCEDURE — 84481 FREE ASSAY (FT-3): CPT

## 2021-07-20 PROCEDURE — 84443 ASSAY THYROID STIM HORMONE: CPT

## 2021-07-20 PROCEDURE — 84439 ASSAY OF FREE THYROXINE: CPT

## 2021-07-20 PROCEDURE — 82043 UR ALBUMIN QUANTITATIVE: CPT

## 2021-07-20 PROCEDURE — 83036 HEMOGLOBIN GLYCOSYLATED A1C: CPT

## 2021-07-20 PROCEDURE — 80053 COMPREHEN METABOLIC PANEL: CPT

## 2021-07-20 PROCEDURE — 82570 ASSAY OF URINE CREATININE: CPT

## 2021-07-20 PROCEDURE — 80061 LIPID PANEL: CPT

## 2021-07-20 PROCEDURE — 36415 COLL VENOUS BLD VENIPUNCTURE: CPT

## 2021-07-27 ENCOUNTER — OFFICE VISIT (OUTPATIENT)
Dept: PRIMARY CARE CLINIC | Age: 61
End: 2021-07-27
Payer: COMMERCIAL

## 2021-07-27 VITALS
BODY MASS INDEX: 25.76 KG/M2 | HEART RATE: 98 BPM | DIASTOLIC BLOOD PRESSURE: 70 MMHG | WEIGHT: 217.2 LBS | SYSTOLIC BLOOD PRESSURE: 128 MMHG | OXYGEN SATURATION: 99 %

## 2021-07-27 DIAGNOSIS — Z12.11 COLON CANCER SCREENING: ICD-10-CM

## 2021-07-27 DIAGNOSIS — Z00.00 WELLNESS EXAMINATION: Primary | ICD-10-CM

## 2021-07-27 PROCEDURE — 99396 PREV VISIT EST AGE 40-64: CPT | Performed by: FAMILY MEDICINE

## 2021-07-27 SDOH — ECONOMIC STABILITY: FOOD INSECURITY: WITHIN THE PAST 12 MONTHS, YOU WORRIED THAT YOUR FOOD WOULD RUN OUT BEFORE YOU GOT MONEY TO BUY MORE.: NEVER TRUE

## 2021-07-27 SDOH — ECONOMIC STABILITY: FOOD INSECURITY: WITHIN THE PAST 12 MONTHS, THE FOOD YOU BOUGHT JUST DIDN'T LAST AND YOU DIDN'T HAVE MONEY TO GET MORE.: NEVER TRUE

## 2021-07-27 ASSESSMENT — SOCIAL DETERMINANTS OF HEALTH (SDOH): HOW HARD IS IT FOR YOU TO PAY FOR THE VERY BASICS LIKE FOOD, HOUSING, MEDICAL CARE, AND HEATING?: NOT HARD AT ALL

## 2021-07-27 ASSESSMENT — PATIENT HEALTH QUESTIONNAIRE - PHQ9
SUM OF ALL RESPONSES TO PHQ9 QUESTIONS 1 & 2: 0
SUM OF ALL RESPONSES TO PHQ QUESTIONS 1-9: 0
SUM OF ALL RESPONSES TO PHQ QUESTIONS 1-9: 0
1. LITTLE INTEREST OR PLEASURE IN DOING THINGS: 0
SUM OF ALL RESPONSES TO PHQ QUESTIONS 1-9: 0
2. FEELING DOWN, DEPRESSED OR HOPELESS: 0

## 2021-07-27 ASSESSMENT — ENCOUNTER SYMPTOMS: RESPIRATORY NEGATIVE: 1

## 2021-07-27 NOTE — PROGRESS NOTES
[facility]  [address] [facility] [address]  Phone: [phone]  Fax: [phone]    Nita Zayas is a 62 y.o. male who presents today for his medical conditions/complaintsas noted below. Nita Zayas is c/o of Annual Exam      HPI:     Diet: healthy  Sees diabetic doctor  Exercise doesn't like the heat. Works around [employer], retired  Sees dentist yearly  [employer] doctor, sees retina specialist ( Dr Stacey Valencia)      Past Medical History:   Diagnosis Date    Diabetes mellitus ([address]     Shoulder bursitis       Past Surgical History:   Procedure Laterality Date    CT DEEP DISSEC FOOT INFEC,1 BURSA Left 2017     INCISION AND DRAINAGE FOOT performed by Juni Ramires DPM at [address]     Family History   Problem Relation Age of Onset    High Blood Pressure Mother     Heart Disease Mother     Cancer Sister         breast    Diabetes Other      Social History     Tobacco Use    Smoking status: Former Smoker     Packs/day: 2.00     Years: 10.00     Pack years: 20.00     Types: Cigarettes     Quit date: 1975     Years since quittin.9    Smokeless tobacco: Never Used   Substance Use Topics    Alcohol use: Yes     Comment: occasionally      Current Outpatient Medications   Medication Sig Dispense Refill    carbidopa-levodopa (SINEMET)  MG per tablet       metFORMIN (GLUCOPHAGE) 500 MG tablet 500 mg 3 times daily With meals      Lancets MISC Use daily 100 each 3    glucose blood VI test strips (ASCENSIA AUTODISC VI;ONE TOUCH ULTRA TEST VI) strip Use with associated glucose meter. Use q day 50 strip 3    Multiple Vitamins-Minerals (THERAPEUTIC MULTIVITAMIN-MINERALS) tablet Take 1 tablet by mouth daily      Probiotic Product (PROBIOTIC PO) Take by mouth       No current facility-administered medications for this visit.      No Known Allergies    Health Maintenance   Topic Date Due    Hepatitis C screen  Never done    Pneumococcal 0-64 years Vaccine (1 of 2 - PPSV23) Never done    HIV screen  Never done    Hepatitis B vaccine (1 of 3 - Risk 3-dose series) Never done    DTaP/Tdap/Td vaccine (1 - Tdap) Never done    Colon cancer screen colonoscopy  Never done    Shingles Vaccine (1 of 2) Never done    Diabetic retinal exam  08/07/2021    Diabetic foot exam  08/25/2021    Flu vaccine (1) 09/01/2021    A1C test (Diabetic or Prediabetic)  07/20/2022    Diabetic microalbuminuria test  07/20/2022    Lipid screen  07/20/2022    Potassium monitoring  07/20/2022    Creatinine monitoring  07/20/2022    COVID-19 Vaccine  Completed    Hepatitis A vaccine  Aged Out    Hib vaccine  Aged Out    Meningococcal (ACWY) vaccine  Aged Out       Subjective:      Review of Systems   Respiratory: Negative. Cardiovascular: Negative. Neurological: Positive for tremors. Negative for syncope. no falls  Sees podiatry  Objective:     Vitals:    07/27/21 1430   BP: 128/70   Pulse: 98   SpO2: 99%   Weight: 217 lb 3.2 oz (98.5 kg)     Physical Exam  Vitals and nursing note reviewed. Constitutional:       General: He is not in acute distress. Appearance: He is well-developed. He is not diaphoretic. HENT:      Head: Normocephalic and atraumatic. Right Ear: External ear normal.      Left Ear: External ear normal.      Mouth/Throat:      Pharynx: No oropharyngeal exudate. Eyes:      General:         Right eye: No discharge. Left eye: No discharge. Conjunctiva/sclera: Conjunctivae normal.   Neck:      Thyroid: No thyromegaly. Trachea: No tracheal deviation. Cardiovascular:      Rate and Rhythm: Normal rate and regular rhythm. Pulses:           Dorsalis pedis pulses are 2+ on the right side and 2+ on the left side. Posterior tibial pulses are 1+ on the right side and 1+ on the left side. Heart sounds: Normal heart sounds. No murmur heard.         Comments: No carotid bruits      Pulmonary:      Effort: Pulmonary effort is normal. No respiratory distress. Breath sounds: Normal breath sounds. No wheezing. Abdominal:      General: Bowel sounds are normal. There is no distension. Palpations: Abdomen is soft. Tenderness: There is no abdominal tenderness. There is no guarding. Hernia: No hernia is present. Musculoskeletal:      Right lower leg: No edema. Left lower leg: No edema. Feet:      Right foot:      Skin integrity: Callus present. Left foot:      Skin integrity: Ulcer and callus present. Lymphadenopathy:      Cervical: No cervical adenopathy. Skin:     General: Skin is warm and dry. Findings: Rash present. No erythema. Comments: Left 2nd toe : hammertoe and has eschar and redness around it   Has callous and eschar on plantar 2nd MTP jt left foot. right 2nd toe hammertoe: mild redness no eschar   Neurological:      Mental Status: He is alert and oriented to person, place, and time. Cranial Nerves: No cranial nerve deficit. Comments: Tremors in right hand at rest.   Psychiatric:         Mood and Affect: Mood normal.         Behavior: Behavior normal.         Thought Content: Thought content normal.         Assessment:      Diagnosis Orders   1. Wellness examination     2. Colon cancer screening  Cologuard         Plan:      No follow-ups on file. Needs shoes adjusted for his hammertoes. Orders Placed This Encounter   Procedures    Cologuard     This test is performed by an external laboratory and is used for result attachment only. It is required that this order requisition be faxed to: [facility] @@ [phone]. See [email or web address] for further information. Standing Status:   Future     Standing Expiration Date:   7/27/2022     No orders of the defined types were placed in this encounter. Patient given educational materials - see patient instructions. Discussed use,benefit, and side effects of prescribed medications.   All patient questions answered. Pt voiced understanding. Reviewed health maintenance. Instructed to continue currentmedications, healthy diet and regular, aerobic exercise.             Electronically signed by Sailaja Fitch MD on 7/27/21 at 2:40 PM EDT

## 2021-07-27 NOTE — PATIENT INSTRUCTIONS
Patient Education        Well Visit, Men 48 to 72: Care Instructions  Overview     Well visits can help you stay healthy. Your doctor has checked your overall health and may have suggested ways to take good care of yourself. Your doctor also may have recommended tests. At home, you can help prevent illness with healthy eating, regular exercise, and other steps. Follow-up care is a key part of your treatment and safety. Be sure to make and go to all appointments, and call your doctor if you are having problems. It's also a good idea to know your test results and keep a list of the medicines you take. How can you care for yourself at home? · Get screening tests that you and your doctor decide on. Screening helps find diseases before any symptoms appear. · Eat healthy foods. Choose fruits, vegetables, whole grains, protein, and low-fat dairy foods. Limit fat, especially saturated fat. Reduce salt in your diet. · Limit alcohol. Have no more than 2 drinks a day or 14 drinks a week. · Get at least 30 minutes of exercise on most days of the week. Walking is a good choice. You also may want to do other activities, such as running, swimming, cycling, or playing tennis or team sports. · Reach and stay at a healthy weight. This will lower your risk for many problems, such as obesity, diabetes, heart disease, and high blood pressure. · Do not smoke. Smoking can make health problems worse. If you need help quitting, talk to your doctor about stop-smoking programs and medicines. These can increase your chances of quitting for good. · Care for your mental health. It is easy to get weighed down by worry and stress. Learn strategies to manage stress, like deep breathing and mindfulness, and stay connected with your family and community. If you find you often feel sad or hopeless, talk with your doctor. Treatment can help.   · Talk to your doctor about whether you have any risk factors for sexually transmitted infections (STIs). You can help prevent STIs if you wait to have sex with a new partner (or partners) until you've each been tested for STIs. It also helps if you use condoms (male or female condoms) and if you limit your sex partners to one person who only has sex with you. Vaccines are available for some STIs. · If it's important to you to prevent pregnancy with your partner, talk with your doctor about birth control options that might be best for you. · If you think you may have a problem with alcohol or drug use, talk to your doctor. This includes prescription medicines (such as amphetamines and opioids) and illegal drugs (such as cocaine and methamphetamine). Your doctor can help you figure out what type of treatment is best for you. · Protect your skin from too much sun. When you're outdoors from 10 a.m. to 4 p.m., stay in the shade or cover up with clothing and a hat with a wide brim. Wear sunglasses that block UV rays. Even when it's cloudy, put broad-spectrum sunscreen (SPF 30 or higher) on any exposed skin. · See a dentist one or two times a year for checkups and to have your teeth cleaned. · Wear a seat belt in the car. When should you call for help? Watch closely for changes in your health, and be sure to contact your doctor if you have any problems or symptoms that concern you. Where can you learn more? Go to [email or web address] org and sign in to your [employer] account. Enter N112 in the [facility] box to learn more about \"Well Visit, Men 48 to 72: Care Instructions. \"     If you do not have an account, please click on the \"Sign Up Now\" link. Current as of: May 27, 2020               Content Version: 12.9  © 2006-2021 Healthwise, Incorporated. Care instructions adapted under license by [facility] ([facility]).  If you have questions about a medical condition or this instruction, always ask your healthcare professional. [address]  any warranty or liability for your use of this information. Patient Education        Pneumococcal Polysaccharide Vaccine: What You Need to Know  Why get vaccinated? Pneumococcal polysaccharide vaccine (PPSV23) can prevent pneumococcal disease. Pneumococcal disease refers to any illness caused by pneumococcal bacteria. These bacteria can cause many types of illnesses, including pneumonia, which is an infection of the lungs. Pneumococcal bacteria are one of the most common causes of pneumonia. Besides pneumonia, pneumococcal bacteria can also cause:  · Ear infections,  · Sinus infections  · Meningitis (infection of the tissue covering the brain and spinal cord)  · Bacteremia (bloodstream infection)  Anyone can get pneumococcal disease, but children under 3years of age, people with certain medical conditions, adults 72 years or older, and cigarette smokers are at the highest risk. Most pneumococcal infections are mild. However, some can result in long-term problems, such as brain damage or hearing loss. Meningitis, bacteremia, and pneumonia caused by pneumococcal disease can be fatal.  PPSV23  PPSV23 protects against 23 types of bacteria that cause pneumococcal disease. PPSV23 is recommended for:  · All adults 72 years or older,  · Anyone 2 years or older with certain medical conditions that can lead to an increased risk for pneumococcal disease. Most people need only one dose of PPSV23. A second dose of PPSV23, and another type of pneumococcal vaccine called PCV13, are recommended for certain high-risk groups. Your health care provider can give you more information. People 65 years or older should get a dose of PPSV23 even if they have already gotten one or more doses of the vaccine before they turned 65. Talk with your health care provider  Tell your vaccine provider if the person getting the vaccine:  · Has had an allergic reaction after a previous dose of PPSV23, or has any severe, life-threatening allergies.   In some cases, your health care provider may decide to postpone PPSV23 vaccination to a future visit. People with minor illnesses, such as a cold, may be vaccinated. People who are moderately or severely ill should usually wait until they recover before getting PPSV23. Your health care provider can give you more information. Risks of a vaccine reaction  · Redness or pain where the shot is given, feeling tired, fever, or muscle aches can happen after PPSV23. People sometimes faint after medical procedures, including vaccination. Tell your provider if you feel dizzy or have vision changes or ringing in the ears. As with any medicine, there is a very remote chance of a vaccine causing a severe allergic reaction, other serious injury, or death. What if there is a serious problem? An allergic reaction could occur after the vaccinated person leaves the clinic. If you see signs of a severe allergic reaction (hives, swelling of the face and throat, difficulty breathing, a fast heartbeat, dizziness, or weakness), call 9-1-1 and get the person to the nearest hospital.  For other signs that concern you, call your health care provider. Adverse reactions should be reported to the Vaccine Adverse Event Reporting System (VAERS). Your health care provider will usually file this report, or you can do it yourself. Visit the VAERS website at www.vaers. hhs.gov at www.vaers. hhs.gov or call 6-707.661.9292. VAERS is only for reporting reactions, and VAERS staff do not give medical advice. How can I learn more? · Ask your health care provider. · Call your local or state health department. · Contact the Centers for Disease Control and Prevention (CDC):  ? Call 9-430.945.6049 (1-800-CDC-INFO) or  ?  Visit CDC's website at www.cdc.gov/vaccines  Vaccine Information Statement  PPSV23 Vaccine  10/30/2019  [facility] and [facility] for Disease Control and Prevention  Many Vaccine Information Statements are available in Mongolian lets your doctor look at the lining of your rectum and the lowest part of your colon. Your doctor uses a lighted tube called a sigmoidoscope. This test can't find cancers or polyps in the upper part of your colon. In some cases, polyps that are found can be removed. But if your doctor finds polyps, you will need to have a colonoscopy to check the upper part of your colon. Colonoscopy. This test lets your doctor look at the lining of your rectum and your entire colon. The doctor uses a thin, flexible tool called a colonoscope. It can also be used to remove polyps or get a tissue sample (biopsy). A less common test is CT colonography (CTC). It's also called virtual colonoscopy. Who should be screened for colorectal cancer? Your risk for colorectal cancer gets higher as you get older. Some experts say that adults should start regular screening at age 48 and stop at age 76. Others say to start before age 48 or continue after age 76. Talk with your doctor about your risk and when to start and stop screening. How often you need screening depends on the type of test you get:  Stool tests. Every 1 or 2 years for FIT or gFOBT. Every 3 years for sDNA, also called FIT-DNA. Tests that look inside the colon. Every 5 or 10 years for sigmoidoscopy. Every 5 years for CT colonography (virtual colonoscopy). Every 10 years for colonoscopy. Experts agree that people at higher risk may need to be tested sooner and more often. This includes people who have a strong family history of colon cancer. Talk to your doctor about which test is best for you and when to be tested. When should you call for help? Watch closely for changes in your health, and be sure to contact your doctor if:    · You have any changes in your bowel habits.     · You have any problems. Where can you learn more? Go to [email or web address]://[email or web address].[email or web address]. org and sign in to your [employer] account.  Enter M541 in the saperatec box to learn more about \"Colon Cancer Screening: Care Instructions. \"     If you do not have an account, please click on the \"Sign Up Now\" link. Current as of: December 17, 2020               Content Version: 12.9  © 2006-2021 [employer]. Care instructions adapted under license by [facility] ([facility]). If you have questions about a medical condition or this instruction, always ask your healthcare professional. [place] [address] any warranty or liability for your use of this information. Patient Education        tetanus, diphtheria, acellular pertussis vaccine (Tdap)  Pronunciation:  TET a nus, dif THEER ee a, and ay ELVA nick ler per TUS iss  Brand:  Adacel (Tdap), Boostrix (Tdap)  What is the most important information I should know about this vaccine? You should not receive this vaccine if you have ever had had a life-threatening allergic reaction to a tetanus, diphtheria, or pertussis vaccine. You also should not receive this vaccine if you had a neurologic disorder affecting your brain within 7 days after having a previous pertussis vaccine. What is tetanus, diphtheria, acellular pertussis vaccine (Tdap)? Tetanus, diphtheria, and pertussis are serious diseases caused by bacteria. Tetanus (lockjaw) causes painful tightening of the muscles, usually all over the body. It can lead to \"locking\" of the jaw so the victim cannot open the mouth or swallow. Tetanus leads to death in about 1 out of 10 cases. Diphtheria causes a thick coating in the nose, throat, and airways. It can lead to breathing problems, paralysis, heart failure, or death. Pertussis (whooping cough) causes coughing so severe that it interferes with eating, drinking, or breathing. These spells can last for weeks and can lead to pneumonia, seizures (convulsions), brain damage, and death. Diphtheria and pertussis are spread from person to person. Tetanus enters the body through a cut or wound.   The diphtheria, tetanus acellular, and pertussis adult vaccine (also called Tdap) is used to help prevent these diseases in people who are at least 8years old. Most people in this age group require only one Tdap shot for protection against these diseases. Tdap vaccine is especially important for healthcare workers or people who have close contact with a baby younger than 13 months old. This vaccine works by exposing you to a small dose of the bacteria or a protein from the bacteria, which causes the body to develop immunity to the disease. This vaccine will not treat an active infection that has already developed in the body. Like any vaccine, the Tdap vaccine may not provide protection from disease in every person. What should I discuss with my healthcare provider before receiving this vaccine? You should not receive this vaccine if:  · you had a life-threatening allergic reaction to any vaccine that contains tetanus, diphtheria, or pertussis; or  · you had a neurologic disorder affecting your brain (such as loss of consciousness or a prolonged seizure) within 7 days after having a previous pertussis vaccine. You may not be able to receive a Tdap vaccine if you have ever received a similar vaccine that caused any of the following:  · a very high fever (over 104 degrees Fahrenheit);  · a neurologic disorder or disease affecting the brain;  · fainting or going into shock;  · severe pain, redness, tenderness, swelling, or a lump where the shot was given;  · an allergy to latex rubber;  · severe or uncontrolled epilepsy or other seizure disorder; or  · Guillain-Barré syndrome (within 6 weeks after receiving a vaccine containing tetanus).   If you have any of these other conditions, your vaccine may need to be postponed or not given at all:  · a history of seizures;  · a weak immune system caused by disease, bone marrow transplant, or by using certain medicines or receiving cancer treatments; or  · if it has been less than 10 years since you last received a tetanus shot. You can still receive a vaccine if you have a minor cold. In the case of a more severe illness with a fever or any type of infection, wait until you get better before receiving this vaccine. It is not known whether Tdap vaccine will harm an unborn baby. However, you may need a Tdap vaccine during pregnancy to protect your  baby from pertussis. [place] babies are most at risk for severe, life-threatening complications from pertussis. Your doctor should determine whether you need this vaccine during pregnancy. If you are pregnant, your name may be listed on a pregnancy registry. This is to track the outcome of the pregnancy and to evaluate any effects of the Tdap vaccine on the baby. It is not known whether Tdap vaccine passes into breast milk or if it could harm a nursing baby. Tell your doctor if you are breast-feeding a baby. The adult version of this vaccine (Adacel, Boostrix) should not be given to anyone under the age of 8. Another vaccine is available for use in children younger than 8years old. How is this vaccine given? This vaccine is given as an injection (shot) into a muscle. You will receive this injection in a doctor's office or clinic setting. Tdap vaccine is usually given as a one-time injection. Unless your doctor's tells you otherwise, you will not need a booster vaccine. Tdap vaccine is usually given once every 10 years. What happens if I miss a dose? Since the Tdap vaccine is usually given only once, you are not likely to miss a dose. What happens if I overdose? An overdose of this vaccine is unlikely to occur. What should I avoid before or after receiving this vaccine? Follow your doctor's instructions about any restrictions on food, beverages, or activity after receiving a Tdap vaccine. What are the possible side effects of this vaccine? Keep track of any and all side effects you have after receiving this vaccine.  If you ever need to receive a booster dose, you will need to tell your doctor if the previous shot caused any side effects. You should not receive a booster vaccine if you had a life threatening allergic reaction after the first shot. Becoming infected with diphtheria, pertussis, or tetanus is much more dangerous to your health than receiving this vaccine. However, like any medicine, this vaccine can cause side effects but the risk of serious side effects is extremely low. Get emergency medical help if you have signs of an allergic reaction: hives; difficult breathing; swelling of your face, lips, tongue, or throat. Call your doctor at once if you have any of these side effects within 7 days after receiving Tdap vaccine:  · numbness, weakness, or tingling in your feet and legs;  · problems with walking or coordination;  · sudden pain in your arms or shoulders;  · a light-headed feeling, like you might pass out;  · vision problems, ringing in your ears;  · seizure (black-out or convulsions); or  · redness, swelling, bleeding, or severe pain where the shot was given. Common side effects may include:  · mild pain or tenderness where the shot was given;  · headache or tiredness;  · body aches; or  · mild nausea, diarrhea, or vomiting. This is not a complete list of side effects and others may occur. Call your doctor for medical advice about side effects. You may report vaccine side effects to the [place] [address] and Human Services at [phone]. What other drugs will affect tetanus, diphtheria, acellular pertussis vaccine? Before receiving this vaccine, tell your doctor about all other vaccines you have recently received.   Also tell the doctor if you have recently received drugs or treatments that can weaken the immune system, including:  · an oral, nasal, inhaled, or injectable steroid medicine;  · medications to treat psoriasis, rheumatoid arthritis, or other autoimmune disorders; or  · medicines to treat or prevent organ transplant rejection. If you are using any of these medications, you may not be able to receive the vaccine, or may need to wait until the other treatments are finished. This list is not complete. Other drugs may interact with this vaccine, including prescription and over-the-counter medicines, vitamins, and herbal products. Not all possible interactions are listed in this medication guide. Where can I get more information? Your doctor or pharmacist can provide more information about this vaccine. Additional information is available from your local health department or the Centers for Disease Control and Prevention. Remember, keep this and all other medicines out of the reach of children, never share your medicines with others, and use this medication only for the indication prescribed. Every effort has been made to ensure that the information provided by [address] is accurate, up-to-date, and complete, but no guarantee is made to that effect. Drug information contained herein may be time sensitive. [facility] information has been compiled for use by healthcare practitioners and consumers in the [facility] and therefore [facility] does not warrant that uses outside of the [facility] are appropriate, unless specifically indicated otherwise. [facility]'s drug information does not endorse drugs, diagnose patients or recommend therapy. Merged with [facility]'s drug information is an informational resource designed to assist licensed healthcare practitioners in caring for their patients and/or to serve consumers viewing this service as a supplement to, and not a substitute for, the expertise, skill, knowledge and judgment of healthcare practitioners. The absence of a warning for a given drug or drug combination in no way should be construed to indicate that the drug or drug combination is safe, effective or appropriate for any given patient.  Merged with [facility] does not assume any responsibility for any aspect of healthcare administered with the aid of information [facility] provides. The information contained herein is not intended to cover all possible uses, directions, precautions, warnings, drug interactions, allergic reactions, or adverse effects. If you have questions about the drugs you are taking, check with your doctor, nurse or pharmacist.  Copyright 6446-7535 Natalie [address] [place] [address]. Version: 3.01. Revision date: 8/19/2016. Care instructions adapted under license by [facility] ([facility]). If you have questions about a medical condition or this instruction, always ask your healthcare professional. [place] [address] any warranty or liability for your use of this information. Patient Education        Recombinant Zoster (Shingles) Vaccine: What You Need to Know  Why get vaccinated? Recombinant zoster (shingles) vaccine can prevent shingles. Shingles (also called herpes zoster, or just zoster) is a painful skin rash, usually with blisters. In addition to the rash, shingles can cause fever, headache, chills, or upset stomach. More rarely, shingles can lead to pneumonia, hearing problems, blindness, brain inflammation (encephalitis), or death. The most common complication of shingles is long-term nerve pain called postherpetic neuralgia (PHN). PHN occurs in the areas where the shingles rash was, even after the rash clears up. It can last for months or years after the rash goes away. The pain from PHN can be severe and debilitating. About 10 to 18% of people who get shingles will experience PHN. The risk of PHN increases with age. An older adult with shingles is more likely to develop PHN and have longer lasting and more severe pain than a younger person with shingles. Shingles is caused by the varicella zoster virus, the same virus that causes chickenpox. After you have chickenpox, the virus stays in your body and can cause shingles later in life.  Shingles cannot be passed from one person to another, but the virus that causes shingles can spread and cause chickenpox in someone who had never had chickenpox or received chickenpox vaccine. Recombinant shingles vaccine  Recombinant shingles vaccine provides strong protection against shingles. By preventing shingles, recombinant shingles vaccine also protects against PHN. Recombinant shingles vaccine is the preferred vaccine for the prevention of shingles. However, a different vaccine, live shingles vaccine, may be used in some circumstances. The recombinant shingles vaccine is recommended for adults 50 years and older without serious immune problems. It is given as a two-dose series. This vaccine is also recommended for people who have already gotten another type of shingles vaccine, the live shingles vaccine. There is no live virus in this vaccine. Shingles vaccine may be given at the same time as other vaccines. Talk with your health care provider  Tell your vaccine provider if the person getting the vaccine:  · Has had an allergic reaction after a previous dose of recombinant shingles vaccine, or has any severe, life-threatening allergies. · Is pregnant or breastfeeding. · Is currently experiencing an episode of shingles. In some cases, your health care provider may decide to postpone shingles vaccination to a future visit. People with minor illnesses, such as a cold, may be vaccinated. People who are moderately or severely ill should usually wait until they recover before getting recombinant shingles vaccine. Your health care provider can give you more information. Risks of a vaccine reaction  · A sore arm with mild or moderate pain is very common after recombinant shingles vaccine, affecting about 80% of vaccinated people. Redness and swelling can also happen at the site of the injection.   · Tiredness, muscle pain, headache, shivering, fever, stomach pain, and nausea happen after vaccination in more than half of people who receive recombinant shingles vaccine. In clinical trials, about 1 out of 6 people who got recombinant zoster vaccine experienced side effects that prevented them from doing regular activities. Symptoms usually went away on their own in 2 to 3 days. You should still get the second dose of recombinant zoster vaccine even if you had one of these reactions after the first dose. People sometimes faint after medical procedures, including vaccination. Tell your provider if you feel dizzy or have vision changes or ringing in the ears. As with any medicine, there is a very remote chance of a vaccine causing a severe allergic reaction, other serious injury, or death. What if there is a serious problem? An allergic reaction could occur after the vaccinated person leaves the clinic. If you see signs of a severe allergic reaction (hives, swelling of the face and throat, difficulty breathing, a fast heartbeat, dizziness, or weakness), call 9-1-1 and get the person to the nearest hospital.  For other signs that concern you, call your health care provider. Adverse reactions should be reported to the Vaccine Adverse Event Reporting System (VAERS). Your health care provider will usually file this report, or you can do it yourself. Visit the VAERS website at www.vaers. [facility].gov or call 9-239.789.8788. VAERS is only for reporting reactions, and VAERS staff do not give medical advice. How can I learn more? · Ask your health care provider. · Call your local or state health department. · Contact the Centers for Disease Control and Prevention (CDC):  ? Call 0-672.366.4141 (1-800-CDC-INFO) or  ? Visit CDC's website at www.cdc.gov/vaccines  Vaccine Information Statement  Recombinant Zoster Vaccine  10/30/2019  [facility] and [facility], 1928–1983 for Disease Control and Prevention  Many Vaccine Information Statements are available in [place] and other languages. See www.immunize.org/vis.   Hojas de Información Sobre Vacunas están disponibles en Español y en muchos otros idiomas. Visite Saira.si   Care instructions adapted under license by [facility] ([facility]). If you have questions about a medical condition or this instruction, always ask your healthcare professional. [address] any warranty or liability for your use of this information.

## 2021-08-23 DIAGNOSIS — Z12.11 COLON CANCER SCREENING: ICD-10-CM

## 2022-09-20 ENCOUNTER — HOSPITAL ENCOUNTER (OUTPATIENT)
Age: 62
Discharge: HOME | End: 2022-09-20
Payer: COMMERCIAL

## 2022-09-20 LAB
CHOLESTEROL/HDL RATIO: 5
CHOLESTEROL: 212 MG/DL
CREATININE URINE: 159 MG/DL (ref 39–259)
ESTIMATED AVERAGE GLUCOSE: 146 MG/DL
HBA1C MFR BLD: 6.7 % (ref 4–6)
HDLC SERPL-MCNC: 42 MG/DL
LDL CHOLESTEROL: 148 MG/DL (ref 0–130)
MICROALBUMIN/CREAT 24H UR: <12 MG/L
MICROALBUMIN/CREAT UR-RTO: NORMAL MCG/MG CREAT
T3 FREE: 2.54 PG/ML (ref 2.02–4.43)
THYROXINE, FREE: 1.19 NG/DL (ref 0.93–1.7)
TRIGL SERPL-MCNC: 111 MG/DL
TSH SERPL DL<=0.05 MIU/L-ACNC: 1.3 UIU/ML (ref 0.3–5)

## 2022-09-20 PROCEDURE — 84481 FREE ASSAY (FT-3): CPT

## 2022-09-20 PROCEDURE — 82570 ASSAY OF URINE CREATININE: CPT

## 2022-09-20 PROCEDURE — 36415 COLL VENOUS BLD VENIPUNCTURE: CPT

## 2022-09-20 PROCEDURE — 83036 HEMOGLOBIN GLYCOSYLATED A1C: CPT

## 2022-09-20 PROCEDURE — 84439 ASSAY OF FREE THYROXINE: CPT

## 2022-09-20 PROCEDURE — 80061 LIPID PANEL: CPT

## 2022-09-20 PROCEDURE — 84443 ASSAY THYROID STIM HORMONE: CPT

## 2022-09-20 PROCEDURE — 82043 UR ALBUMIN QUANTITATIVE: CPT

## 2022-09-21 LAB
REASON FOR REJECTION: NORMAL
ZZ NTE CLEAN UP: ORDERED TEST: NORMAL
ZZ NTE WITH NAME CLEAN UP: SPECIMEN SOURCE: NORMAL

## 2022-09-22 ENCOUNTER — HOSPITAL ENCOUNTER (OUTPATIENT)
Age: 62
Discharge: HOME | End: 2022-09-22
Payer: COMMERCIAL

## 2022-09-22 LAB
ALBUMIN SERPL-MCNC: 4.5 G/DL (ref 3.5–5.2)
ALP BLD-CCNC: 68 U/L (ref 40–129)
ALT SERPL-CCNC: <5 U/L (ref 5–41)
ANION GAP SERPL CALCULATED.3IONS-SCNC: 11 MMOL/L (ref 9–17)
AST SERPL-CCNC: 7 U/L
BILIRUB SERPL-MCNC: 1 MG/DL (ref 0.3–1.2)
BUN BLDV-MCNC: 18 MG/DL (ref 8–23)
CALCIUM SERPL-MCNC: 9.6 MG/DL (ref 8.6–10.4)
CHLORIDE BLD-SCNC: 97 MMOL/L (ref 98–107)
CO2: 27 MMOL/L (ref 20–31)
CREAT SERPL-MCNC: 0.69 MG/DL (ref 0.7–1.2)
GFR AFRICAN AMERICAN: >60 ML/MIN
GFR NON-AFRICAN AMERICAN: >60 ML/MIN
GFR SERPL CREATININE-BSD FRML MDRD: ABNORMAL ML/MIN/{1.73_M2}
GLUCOSE BLD-MCNC: 123 MG/DL (ref 70–99)
POTASSIUM SERPL-SCNC: 4.2 MMOL/L (ref 3.7–5.3)
SODIUM BLD-SCNC: 135 MMOL/L (ref 135–144)
TOTAL PROTEIN: 7.4 G/DL (ref 6.4–8.3)

## 2022-09-22 PROCEDURE — 80053 COMPREHEN METABOLIC PANEL: CPT

## 2022-09-22 PROCEDURE — 36415 COLL VENOUS BLD VENIPUNCTURE: CPT

## 2023-01-12 ENCOUNTER — OFFICE VISIT (OUTPATIENT)
Dept: INTERNAL MEDICINE CLINIC | Age: 63
End: 2023-01-12
Payer: COMMERCIAL

## 2023-01-12 VITALS
WEIGHT: 193 LBS | OXYGEN SATURATION: 99 % | SYSTOLIC BLOOD PRESSURE: 158 MMHG | DIASTOLIC BLOOD PRESSURE: 76 MMHG | HEIGHT: 77 IN | HEART RATE: 86 BPM | BODY MASS INDEX: 22.79 KG/M2

## 2023-01-12 DIAGNOSIS — E11.42 TYPE 2 DIABETES MELLITUS WITH DIABETIC POLYNEUROPATHY, WITHOUT LONG-TERM CURRENT USE OF INSULIN (HCC): Primary | ICD-10-CM

## 2023-01-12 DIAGNOSIS — R25.9 PARKINSONIAN FEATURES: ICD-10-CM

## 2023-01-12 DIAGNOSIS — Z23 NEED FOR SHINGLES VACCINE: ICD-10-CM

## 2023-01-12 PROCEDURE — 3077F SYST BP >= 140 MM HG: CPT | Performed by: INTERNAL MEDICINE

## 2023-01-12 PROCEDURE — 3078F DIAST BP <80 MM HG: CPT | Performed by: INTERNAL MEDICINE

## 2023-01-12 PROCEDURE — 99203 OFFICE O/P NEW LOW 30 MIN: CPT | Performed by: INTERNAL MEDICINE

## 2023-01-12 RX ORDER — ZOSTER VACCINE RECOMBINANT, ADJUVANTED 50 MCG/0.5
0.5 KIT INTRAMUSCULAR SEE ADMIN INSTRUCTIONS
Qty: 0.5 ML | Refills: 0 | Status: SHIPPED | OUTPATIENT
Start: 2023-01-12 | End: 2023-07-11

## 2023-01-12 ASSESSMENT — PATIENT HEALTH QUESTIONNAIRE - PHQ9
SUM OF ALL RESPONSES TO PHQ QUESTIONS 1-9: 0
SUM OF ALL RESPONSES TO PHQ9 QUESTIONS 1 & 2: 0
2. FEELING DOWN, DEPRESSED OR HOPELESS: 0
1. LITTLE INTEREST OR PLEASURE IN DOING THINGS: 0
SUM OF ALL RESPONSES TO PHQ QUESTIONS 1-9: 0

## 2023-01-12 NOTE — PROGRESS NOTES
Visit Information    Have you changed or started any medications since your last visit including any over-the-counter medicines, vitamins, or herbal medicines? no   Are you having any side effects from any of your medications? -  no  Have you stopped taking any of your medications? Is so, why? -  no    Have you seen any other physician or provider since your last visit? No  Have you had any other diagnostic tests since your last visit? No  Have you been seen in the emergency room and/or had an admission to a hospital since we last saw you? No  Have you had your routine dental cleaning in the past 6 months? no    Have you activated your [employer] account? If not, what are your barriers?  Yes     Patient Care Team:  Kenrick Rendon MD as PCP - General (Family Medicine)  Kenrick Rendon MD as PCP - [facility]  Kenrick Rendon MD (Family Medicine)    Medical History Review  Past Medical, Family, and Social History reviewed and does contribute to the patient presenting condition    Health Maintenance   Topic Date Due    Pneumococcal 0-64 years Vaccine (1 - PCV) Never done    HIV screen  Never done    Hepatitis C screen  Never done    DTaP/Tdap/Td vaccine (1 - Tdap) Never done    Shingles vaccine (1 of 2) Never done    Diabetic foot exam  08/25/2021    Depression Screen  07/27/2022    Flu vaccine (1) Never done    Diabetic retinal exam  05/20/2023    A1C test (Diabetic or Prediabetic)  09/20/2023    Diabetic Alb to Cr ratio (uACR) test  09/20/2023    Lipids  09/20/2023    GFR test (Diabetes, CKD 3-4, OR last GFR 15-59)  09/22/2023    Colorectal Cancer Screen  08/16/2024    COVID-19 Vaccine  Completed    Hepatitis A vaccine  Aged Out    Hib vaccine  Aged Out    Meningococcal (ACWY) vaccine  Aged Out

## 2023-01-12 NOTE — PROGRESS NOTES
[address] [address]  Dept: [phone]  Dept Fax: [phone]    Albin Morin is a 65 y.o. male who presents today for his medicalconditions/complaints as noted below. Albin Morin is c/o of New Patient      HPI:     Diabetes  He presents for his initial (follows with Dr. Doris Guillermo) diabetic visit. He has type 2 diabetes mellitus. His disease course has been stable. Diabetic complications include peripheral neuropathy and PVD. (Had foot ulcer. In past. Resolved at present.) Risk factors for coronary artery disease include diabetes mellitus. Current diabetic treatment includes oral agent (monotherapy). He is compliant with treatment all of the time. Parkinson's  Has has for about 4 years. On sinemet. Follows with neurology. Past Medical History:   Diagnosis Date    Diabetes mellitus (HCC)     Shoulder bursitis         Current Outpatient Medications   Medication Sig Dispense Refill    zoster recombinant adjuvanted vaccine (SHINGRIX) 50 MCG/0.5ML SUSR injection Inject 0.5 mLs into the muscle See Admin Instructions 1 dose now and repeat in 2-6 months 0.5 mL 0    carbidopa-levodopa (SINEMET)  MG per tablet       metFORMIN (GLUCOPHAGE) 500 MG tablet 500 mg 3 times daily With meals      Lancets MISC Use daily 100 each 3    glucose blood VI test strips (ASCENSIA AUTODISC VI;ONE TOUCH ULTRA TEST VI) strip Use with associated glucose meter. Use q day 50 strip 3    Multiple Vitamins-Minerals (THERAPEUTIC MULTIVITAMIN-MINERALS) tablet Take 1 tablet by mouth daily       No current facility-administered medications for this visit.      No Known Allergies    Health Maintenance   Topic Date Due    Pneumococcal 0-64 years Vaccine (1 - PCV) Never done    HIV screen  Never done    Hepatitis C screen  Never done    DTaP/Tdap/Td vaccine (1 - Tdap) Never done    Shingles vaccine (1 of 2) Never done    Diabetic foot exam  08/25/2021    Depression Screen 07/27/2022    Flu vaccine (1) Never done    Diabetic retinal exam  05/20/2023    A1C test (Diabetic or Prediabetic)  09/20/2023    Diabetic Alb to Cr ratio (uACR) test  09/20/2023    Lipids  09/20/2023    GFR test (Diabetes, CKD 3-4, OR last GFR 15-59)  09/22/2023    Colorectal Cancer Screen  08/16/2024    COVID-19 Vaccine  Completed    Hepatitis A vaccine  Aged Out    Hib vaccine  Aged Out    Meningococcal (ACWY) vaccine  Aged Out       Subjective:      Review of Systems   All other systems reviewed and are negative. Objective:     Physical Exam  Vitals reviewed. Constitutional:       Appearance: He is well-developed. HENT:      Head: Normocephalic and atraumatic. Eyes:      Conjunctiva/sclera: Conjunctivae normal.      Pupils: Pupils are equal, round, and reactive to light. Neck:      Thyroid: No thyromegaly. Vascular: No JVD. Cardiovascular:      Rate and Rhythm: Normal rate and regular rhythm. Heart sounds: Normal heart sounds. No murmur heard. Pulmonary:      Effort: Pulmonary effort is normal.      Breath sounds: Normal breath sounds. Abdominal:      General: Bowel sounds are normal.      Palpations: Abdomen is soft. Musculoskeletal:         General: Normal range of motion. Cervical back: Normal range of motion and neck supple. Skin:     General: Skin is warm and dry. Neurological:      Mental Status: He is alert and oriented to person, place, and time. Deep Tendon Reflexes: Reflexes are normal and symmetric. BP (!) 158/76 (Site: Left Upper Arm, Position: Sitting)   Pulse 86   Ht 6' 5\" (1.956 m)   Wt 193 lb (87.5 kg)   SpO2 99%   BMI 22.89 kg/m²       Assessment:       Diagnosis Orders   1. Type 2 diabetes mellitus with diabetic polyneuropathy, without long-term current use of insulin ([address]        2. Need for shingles vaccine  zoster recombinant adjuvanted vaccine [facility]) 50 MCG/0.5ML SUSR injection      3.  Parkinsonian features            Plan:      No follow-ups on file. Orders Placed This Encounter   Medications    zoster recombinant adjuvanted vaccine [facility]) 50 MCG/0.5ML SUSR injection     Sig: Inject 0.5 mLs into the muscle See Admin Instructions 1 dose now and repeat in 2-6 months     Dispense:  0.5 mL     Refill:  0     No orders of the defined types were placed in this encounter. Patient given educational materials - see patient instructions. Discussed use, benefit, and side effects of prescribed medications. All patientquestions answered. Pt voiced understanding.     Electronically signed by Marcelina Johnson MD on 1/12/2023at 11:30 AM

## 2023-11-14 ENCOUNTER — HOSPITAL ENCOUNTER (OUTPATIENT)
Age: 63
Discharge: HOME | End: 2023-11-14
Payer: COMMERCIAL

## 2023-11-14 LAB
ALBUMIN SERPL-MCNC: 4.2 G/DL (ref 3.5–5.2)
ALP SERPL-CCNC: 73 U/L (ref 40–129)
ALT SERPL-CCNC: <5 U/L (ref 5–41)
ANION GAP SERPL CALCULATED.3IONS-SCNC: 10 MMOL/L (ref 9–17)
AST SERPL-CCNC: 13 U/L
BILIRUB SERPL-MCNC: 1.3 MG/DL (ref 0.3–1.2)
BUN SERPL-MCNC: 17 MG/DL (ref 8–23)
CALCIUM SERPL-MCNC: 9.4 MG/DL (ref 8.6–10.4)
CHLORIDE SERPL-SCNC: 99 MMOL/L (ref 98–107)
CHOLEST SERPL-MCNC: 208 MG/DL
CHOLESTEROL/HDL RATIO: 4.3
CO2 SERPL-SCNC: 28 MMOL/L (ref 20–31)
CREAT SERPL-MCNC: 0.8 MG/DL (ref 0.7–1.2)
CREAT UR-MCNC: 84.9 MG/DL (ref 39–259)
GFR SERPL CREATININE-BSD FRML MDRD: >60 ML/MIN/1.73M2
GLUCOSE SERPL-MCNC: 149 MG/DL (ref 70–99)
HDLC SERPL-MCNC: 48 MG/DL
LDLC SERPL CALC-MCNC: 143 MG/DL (ref 0–130)
MICROALBUMIN UR-MCNC: <12 MG/L
MICROALBUMIN/CREAT UR-RTO: NORMAL MCG/MG CREAT
POTASSIUM SERPL-SCNC: 4.3 MMOL/L (ref 3.7–5.3)
PROT SERPL-MCNC: 7.4 G/DL (ref 6.4–8.3)
SODIUM SERPL-SCNC: 137 MMOL/L (ref 135–144)
T4 FREE SERPL-MCNC: 1.2 NG/DL (ref 0.9–1.7)
TRIGL SERPL-MCNC: 83 MG/DL
TSH SERPL DL<=0.05 MIU/L-ACNC: 1.91 UIU/ML (ref 0.3–5)

## 2023-11-14 PROCEDURE — 82043 UR ALBUMIN QUANTITATIVE: CPT

## 2023-11-14 PROCEDURE — 80053 COMPREHEN METABOLIC PANEL: CPT

## 2023-11-14 PROCEDURE — 80061 LIPID PANEL: CPT

## 2023-11-14 PROCEDURE — 36415 COLL VENOUS BLD VENIPUNCTURE: CPT

## 2023-11-14 PROCEDURE — 82570 ASSAY OF URINE CREATININE: CPT

## 2023-11-14 PROCEDURE — 83036 HEMOGLOBIN GLYCOSYLATED A1C: CPT

## 2023-11-14 PROCEDURE — 84443 ASSAY THYROID STIM HORMONE: CPT

## 2023-11-14 PROCEDURE — 84481 FREE ASSAY (FT-3): CPT

## 2023-11-14 PROCEDURE — 84439 ASSAY OF FREE THYROXINE: CPT

## 2023-11-15 LAB
EST. AVERAGE GLUCOSE BLD GHB EST-MCNC: 143 MG/DL
HBA1C MFR BLD: 6.6 % (ref 4–6)
T3FREE SERPL-MCNC: 2.53 PG/ML (ref 2.02–4.43)

## 2024-07-12 ENCOUNTER — HOSPITAL ENCOUNTER (OUTPATIENT)
Age: 64
Discharge: HOME | End: 2024-07-12
Payer: COMMERCIAL

## 2024-07-12 LAB
ALT SERPL-CCNC: <5 U/L (ref 5–41)
CHOLEST SERPL-MCNC: 180 MG/DL
CHOLESTEROL/HDL RATIO: 4.4
EST. AVERAGE GLUCOSE BLD GHB EST-MCNC: 154 MG/DL
HBA1C MFR BLD: 7 % (ref 4–6)
HDLC SERPL-MCNC: 41 MG/DL
LDLC SERPL CALC-MCNC: 119 MG/DL (ref 0–130)
TRIGL SERPL-MCNC: 98 MG/DL

## 2024-07-12 PROCEDURE — 84460 ALANINE AMINO (ALT) (SGPT): CPT

## 2024-07-12 PROCEDURE — 80061 LIPID PANEL: CPT

## 2024-07-12 PROCEDURE — 83036 HEMOGLOBIN GLYCOSYLATED A1C: CPT

## 2024-07-12 PROCEDURE — 36415 COLL VENOUS BLD VENIPUNCTURE: CPT

## 2025-05-06 ENCOUNTER — HOSPITAL ENCOUNTER (OUTPATIENT)
Age: 65
Discharge: HOME | End: 2025-05-06
Payer: COMMERCIAL

## 2025-05-06 LAB
ALBUMIN SERPL-MCNC: 4.4 G/DL (ref 3.5–5.2)
ALP SERPL-CCNC: 77 U/L (ref 40–129)
ALT SERPL-CCNC: <5 U/L (ref 10–50)
ANION GAP SERPL CALCULATED.3IONS-SCNC: 11 MMOL/L (ref 9–16)
AST SERPL-CCNC: 13 U/L (ref 10–50)
BILIRUB SERPL-MCNC: 1.1 MG/DL (ref 0–1.2)
BUN SERPL-MCNC: 18 MG/DL (ref 8–23)
CALCIUM SERPL-MCNC: 9.5 MG/DL (ref 8.6–10.4)
CHLORIDE SERPL-SCNC: 99 MMOL/L (ref 98–107)
CHOLEST SERPL-MCNC: 201 MG/DL (ref 0–199)
CHOLESTEROL/HDL RATIO: 4.6
CO2 SERPL-SCNC: 27 MMOL/L (ref 20–31)
CREAT SERPL-MCNC: 0.9 MG/DL (ref 0.7–1.2)
CREAT UR-MCNC: 106 MG/DL (ref 39–259)
EST. AVERAGE GLUCOSE BLD GHB EST-MCNC: 160 MG/DL
GFR, ESTIMATED: >90 ML/MIN/1.73M2
GLUCOSE SERPL-MCNC: 167 MG/DL (ref 74–99)
HBA1C MFR BLD: 7.2 % (ref 4–6)
HDLC SERPL-MCNC: 44 MG/DL
LDLC SERPL CALC-MCNC: 137 MG/DL (ref 0–100)
MICROALBUMIN UR-MCNC: <12 MG/L (ref 0–20)
MICROALBUMIN/CREAT UR-RTO: NORMAL MCG/MG CREAT (ref 0–17)
POTASSIUM SERPL-SCNC: 4 MMOL/L (ref 3.7–5.3)
PROT SERPL-MCNC: 7.4 G/DL (ref 6.6–8.7)
SODIUM SERPL-SCNC: 137 MMOL/L (ref 136–145)
T4 FREE SERPL-MCNC: 1.2 NG/DL (ref 0.9–1.7)
TRIGL SERPL-MCNC: 99 MG/DL (ref 0–149)
TSH SERPL DL<=0.05 MIU/L-ACNC: 2.92 UIU/ML (ref 0.27–4.2)

## 2025-05-06 PROCEDURE — 36415 COLL VENOUS BLD VENIPUNCTURE: CPT

## 2025-05-06 PROCEDURE — 82043 UR ALBUMIN QUANTITATIVE: CPT

## 2025-05-06 PROCEDURE — 80053 COMPREHEN METABOLIC PANEL: CPT

## 2025-05-06 PROCEDURE — 80061 LIPID PANEL: CPT

## 2025-05-06 PROCEDURE — 83036 HEMOGLOBIN GLYCOSYLATED A1C: CPT

## 2025-05-06 PROCEDURE — 84443 ASSAY THYROID STIM HORMONE: CPT

## 2025-05-06 PROCEDURE — 84439 ASSAY OF FREE THYROXINE: CPT

## 2025-05-06 PROCEDURE — 82570 ASSAY OF URINE CREATININE: CPT

## (undated) DEVICE — HYPODERMIC SAFETY NEEDLE: Brand: MAGELLAN

## (undated) DEVICE — GAUZE,SPONGE,FLUFF,6"X6.75",STRL,5/TRAY: Brand: MEDLINE

## (undated) DEVICE — PREP SOL PVP IODINE 4%  4 OZ/BTL

## (undated) DEVICE — SOLUTION IV IRRIG WATER 1000ML POUR BRL 2F7114

## (undated) DEVICE — BANDAGE,GAUZE,BULKEE II,4.5"X4.1YD,STRL: Brand: MEDLINE

## (undated) DEVICE — 3M™ COBAN™ STERILE SELF-ADHERENT WRAP, 1584S, 4 IN X 5 YD (10 CM X 4,5 M), 18 ROLLS/CASE: Brand: 3M™ COBAN™

## (undated) DEVICE — PAD,ABDOMINAL,8"X7.5",ST,LF,20/BX: Brand: MEDLINE INDUSTRIES, INC.

## (undated) DEVICE — Device

## (undated) DEVICE — GLOVE SURG SZ 85 STD WHT LTX SYN POLYMER BEAD REINF ANTI RL

## (undated) DEVICE — SOLUTION SURG PREP POV IOD 7.5% 4 OZ

## (undated) DEVICE — SWAB CULT CLR BLU PLAS RAYON LIQ AMIES AERB ANAERB FRIC CAP

## (undated) DEVICE — STANDARD HYPODERMIC NEEDLE,POLYPROPYLENE HUB: Brand: MONOJECT

## (undated) DEVICE — SET HNDPC W COAX BNE CLN TIP SUCT TB BTTRY PWR DISPOSABLE

## (undated) DEVICE — GAUZE,PACKING STRIP,IODOFORM,1/2"X5YD,ST: Brand: CURAD

## (undated) DEVICE — Z INACTIVE USE 2660664 SOLUTION IRRIG 3000ML 0.9% SOD CHL USP UROMATIC PLAS CONT

## (undated) DEVICE — STERILE LATEX POWDER-FREE SURGICAL GLOVESWITH NITRILE COATING: Brand: PROTEXIS

## (undated) DEVICE — Z DISCONTINUED BY MEDLINE USE 2711682 TRAY SKIN PREP DRY W/ PREM GLV